# Patient Record
Sex: MALE | Race: WHITE | NOT HISPANIC OR LATINO | Employment: OTHER | URBAN - METROPOLITAN AREA
[De-identification: names, ages, dates, MRNs, and addresses within clinical notes are randomized per-mention and may not be internally consistent; named-entity substitution may affect disease eponyms.]

---

## 2022-02-21 ENCOUNTER — PATIENT ROUNDING (BHMG ONLY) (OUTPATIENT)
Dept: INTERNAL MEDICINE | Facility: CLINIC | Age: 77
End: 2022-02-21

## 2022-02-21 ENCOUNTER — OFFICE VISIT (OUTPATIENT)
Dept: INTERNAL MEDICINE | Facility: CLINIC | Age: 77
End: 2022-02-21

## 2022-02-21 VITALS
DIASTOLIC BLOOD PRESSURE: 64 MMHG | BODY MASS INDEX: 33.4 KG/M2 | TEMPERATURE: 97.3 F | HEART RATE: 62 BPM | OXYGEN SATURATION: 97 % | WEIGHT: 207.8 LBS | SYSTOLIC BLOOD PRESSURE: 118 MMHG | HEIGHT: 66 IN

## 2022-02-21 DIAGNOSIS — Z76.0 MEDICATION REFILL: ICD-10-CM

## 2022-02-21 DIAGNOSIS — E78.49 OTHER HYPERLIPIDEMIA: ICD-10-CM

## 2022-02-21 DIAGNOSIS — I10 BENIGN ESSENTIAL HYPERTENSION: ICD-10-CM

## 2022-02-21 DIAGNOSIS — Z76.89 ENCOUNTER TO ESTABLISH CARE: Primary | ICD-10-CM

## 2022-02-21 DIAGNOSIS — I25.10 ATHEROSCLEROSIS OF NATIVE CORONARY ARTERY OF NATIVE HEART, UNSPECIFIED WHETHER ANGINA PRESENT: ICD-10-CM

## 2022-02-21 PROBLEM — G47.33 OBSTRUCTIVE SLEEP APNEA SYNDROME: Status: ACTIVE | Noted: 2017-02-08

## 2022-02-21 PROBLEM — N40.0 BENIGN PROSTATIC HYPERPLASIA WITHOUT URINARY OBSTRUCTION: Status: ACTIVE | Noted: 2017-02-08

## 2022-02-21 PROBLEM — E78.5 HYPERLIPIDEMIA: Status: ACTIVE | Noted: 2022-02-21

## 2022-02-21 PROCEDURE — 99204 OFFICE O/P NEW MOD 45 MIN: CPT | Performed by: NURSE PRACTITIONER

## 2022-02-21 RX ORDER — METOPROLOL SUCCINATE 25 MG/1
25 TABLET, EXTENDED RELEASE ORAL DAILY
COMMUNITY
End: 2022-02-22 | Stop reason: SDUPTHER

## 2022-02-21 RX ORDER — LOSARTAN POTASSIUM 100 MG/1
100 TABLET ORAL DAILY
COMMUNITY
End: 2022-02-22 | Stop reason: SDUPTHER

## 2022-02-21 RX ORDER — ATORVASTATIN CALCIUM 10 MG/1
10 TABLET, FILM COATED ORAL DAILY
COMMUNITY
End: 2022-02-22 | Stop reason: SDUPTHER

## 2022-02-21 RX ORDER — ASPIRIN 81 MG/1
81 TABLET ORAL DAILY
COMMUNITY
End: 2022-08-23 | Stop reason: HOSPADM

## 2022-02-21 NOTE — PROGRESS NOTES
February 21, 2022    Hello, may I speak with Enzo Dial?    My name is almita  Spoke with pt at np appt    Tell me about your visit with us. What things went well?  no complaints       We're always looking for ways to make our patients' experiences even better. Do you have recommendations on ways we may improve?  no    Overall were you satisfied with your first visit to our practice? yes       I appreciate you taking the time to speak with me today. Is there anything else I can do for you? no      Thank you, and have a great day.

## 2022-02-21 NOTE — PROGRESS NOTES
Date of Encounter: 2022  Patient: Enzo Dial,  1945    Subjective     Chief complaint: Establish care and medication refills    HPI   Patient here today to establish care.  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  Patient is here today to get some medications refilled.    Patient states he used to follow with a cardiologist about twice a year.  He had had some stents placed about 5 years ago.  States that overall he is doing well today.      Review of Systems:  Negative for fever, congestion, chest pain upon exertion, shortness of breath, vision changes, vomiting, dysuria, lymphadenopathy, muscle weakness, numbness, mood changes, rashes.    The following portions of the patient's history were reviewed and updated as appropriate: allergies, current medications, past family history, past medical history, past social history, past surgical history and problem list.    Patient Active Problem List   Diagnosis   • Benign essential hypertension   • Benign prostatic hyperplasia without urinary obstruction   • Coronary atherosclerosis   • Hyperlipidemia   • Obstructive sleep apnea syndrome     History reviewed. No pertinent past medical history.  History reviewed. No pertinent surgical history.  History reviewed. No pertinent family history.    Current Outpatient Medications:   •  aspirin 81 MG EC tablet, Take 81 mg by mouth Daily., Disp: , Rfl:   •  atorvastatin (LIPITOR) 10 MG tablet, Take 10 mg by mouth Daily., Disp: , Rfl:   •  losartan (COZAAR) 100 MG tablet, Take 100 mg by mouth Daily., Disp: , Rfl:   •  metoprolol succinate XL (TOPROL-XL) 25 MG 24 hr tablet, Take 25 mg by mouth Daily., Disp: , Rfl:   No Known Allergies  Social History     Tobacco Use   • Smoking status: Never Smoker   • Smokeless tobacco: Not on file   Substance Use Topics   • Alcohol use: Not on file   • Drug use: Not on file          Objective   Physical Exam   Vitals:     "02/21/22 1126   BP: 118/64   Pulse: 62   Temp: 97.3 °F (36.3 °C)   TempSrc: Temporal   SpO2: 97%   Weight: 94.3 kg (207 lb 12.8 oz)   Height: 167.6 cm (66\")     Body mass index is 33.54 kg/m².    Constitutional: NAD.  Cardiovascular: RRR. No murmurs. No LE edema b/l.   Pulmonary: CTA b/l. Good effort.           Assessment/Plan   Assessment and Plan  Pleasant 77-year-old male with history of benign essential hypertension, coronary atherosclerosis, hyperlipidemia and others here today to establish care.    Diagnoses and all orders for this visit:    1. Encounter to establish care (Primary)   - Welcomed patient to practice. Discussed office policies. Reviewed patient reported medical history at bedside.    2. Other hyperlipidemia  -     CBC & Differential; Future  -     Comprehensive Metabolic Panel; Future  -     Lipid Panel; Future    3. Benign essential hypertension  -     CBC & Differential; Future  -     Comprehensive Metabolic Panel; Future  -     Ambulatory Referral to Cardiology   -Well-controlled on medications.  Patient to continue current medications at this time.    4. Medication refill   -Medications reviewed with patient at bedside.  Refills were sent to pharmacy.    5. Atherosclerosis of native coronary artery of native heart, unspecified whether angina present  -     Ambulatory Referral to Cardiology         Verbalized understanding of and agreed to plan of care.    Return in about 6 months (around 8/21/2022) for Medicare Wellness with fasting. .     A total of 45 minutes of time was spent on this encounter today.  This includes reviewing the patient's records, face-to-face time, documentation.    Renetta Calabrese DNP, FNP-C  [facility]  O: [phone]      Please note that portions of this note were completed with a voice recognition program. Please call if questions.     "

## 2022-02-22 RX ORDER — METOPROLOL SUCCINATE 25 MG/1
25 TABLET, EXTENDED RELEASE ORAL DAILY
Qty: 30 TABLET | Refills: 6 | Status: SHIPPED | OUTPATIENT
Start: 2022-02-22 | End: 2022-08-22

## 2022-02-22 RX ORDER — ATORVASTATIN CALCIUM 10 MG/1
10 TABLET, FILM COATED ORAL DAILY
Qty: 30 TABLET | Refills: 6 | Status: SHIPPED | OUTPATIENT
Start: 2022-02-22 | End: 2022-04-15

## 2022-02-22 RX ORDER — LOSARTAN POTASSIUM 100 MG/1
100 TABLET ORAL DAILY
Qty: 30 TABLET | Refills: 6 | Status: SHIPPED | OUTPATIENT
Start: 2022-02-22 | End: 2022-08-22

## 2022-02-22 NOTE — TELEPHONE ENCOUNTER
Rx Refill Note  Requested Prescriptions     Pending Prescriptions Disp Refills   • losartan (COZAAR) 100 MG tablet       Sig: Take 1 tablet by mouth Daily.   • metoprolol succinate XL (TOPROL-XL) 25 MG 24 hr tablet       Sig: Take 1 tablet by mouth Daily.   • atorvastatin (LIPITOR) 10 MG tablet       Sig: Take 1 tablet by mouth Daily.      Last office visit with prescribing clinician: 2/21/2022      Next office visit with prescribing clinician: Visit date not found            Mildred Cheung LPN  02/22/22, 14:49 EST

## 2022-02-22 NOTE — TELEPHONE ENCOUNTER
Caller: Enzo Dial    Relationship: Self    Best call back number: [phone]    Requested Prescriptions:   Requested Prescriptions     Pending Prescriptions Disp Refills   • losartan (COZAAR) 100 MG tablet       Sig: Take 1 tablet by mouth Daily.   • metoprolol succinate XL (TOPROL-XL) 25 MG 24 hr tablet       Sig: Take 1 tablet by mouth Daily.   • atorvastatin (LIPITOR) 10 MG tablet       Sig: Take 1 tablet by mouth Daily.        Pharmacy where request should be sent: [facility] [address] [address] - [phone] [facility] [phone] FX     Does the patient have less than a 3 day supply:  [x] Yes  [] No    Marlene Goldberg Rep   02/22/22 14:29 EST

## 2022-02-23 ENCOUNTER — CLINICAL SUPPORT (OUTPATIENT)
Dept: INTERNAL MEDICINE | Facility: CLINIC | Age: 77
End: 2022-02-23

## 2022-02-23 DIAGNOSIS — Z23 NEED FOR 23-POLYVALENT PNEUMOCOCCAL POLYSACCHARIDE VACCINE: Primary | ICD-10-CM

## 2022-02-23 PROCEDURE — 90732 PPSV23 VACC 2 YRS+ SUBQ/IM: CPT | Performed by: NURSE PRACTITIONER

## 2022-02-23 PROCEDURE — G0009 ADMIN PNEUMOCOCCAL VACCINE: HCPCS | Performed by: NURSE PRACTITIONER

## 2022-03-01 ENCOUNTER — TELEPHONE (OUTPATIENT)
Dept: INTERNAL MEDICINE | Facility: CLINIC | Age: 77
End: 2022-03-01

## 2022-03-01 ENCOUNTER — OFFICE VISIT (OUTPATIENT)
Dept: CARDIOLOGY | Facility: CLINIC | Age: 77
End: 2022-03-01

## 2022-03-01 VITALS
HEIGHT: 66 IN | DIASTOLIC BLOOD PRESSURE: 80 MMHG | WEIGHT: 203 LBS | SYSTOLIC BLOOD PRESSURE: 126 MMHG | HEART RATE: 71 BPM | BODY MASS INDEX: 32.62 KG/M2

## 2022-03-01 DIAGNOSIS — I10 BENIGN ESSENTIAL HYPERTENSION: Primary | ICD-10-CM

## 2022-03-01 DIAGNOSIS — Z95.5 HISTORY OF CORONARY ARTERY STENT PLACEMENT: Chronic | ICD-10-CM

## 2022-03-01 DIAGNOSIS — I25.10 ATHEROSCLEROSIS OF NATIVE CORONARY ARTERY OF NATIVE HEART, UNSPECIFIED WHETHER ANGINA PRESENT: ICD-10-CM

## 2022-03-01 PROCEDURE — 99204 OFFICE O/P NEW MOD 45 MIN: CPT | Performed by: INTERNAL MEDICINE

## 2022-03-01 PROCEDURE — 93000 ELECTROCARDIOGRAM COMPLETE: CPT | Performed by: INTERNAL MEDICINE

## 2022-03-01 NOTE — PROGRESS NOTES
"    Subjective:     Encounter Date:03/01/22      Patient ID: Enzo Dial is a 77 y.o. male.    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  I appreciate that you sent him in to see us.  They come in today to be seen for his history of coronary artery disease with prior stent placement.    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  While there is troponin was elevated.  He then had a subsequent cardiac catheterization and reportedly a stent was placed.  He is not sure where the stent was placed.  He does not know if there was other areas of coronary arterial sclerosis.  He had an echocardiogram and his understanding is that LV function was normal.    He did not have any anginal symptoms prior to that.  He not had any symptoms since.    The following portions of the patient's history were reviewed and updated as appropriate: allergies, current medications, past family history, past medical history, past social history, past surgical history and problem list.      ECG 12 Lead    Date/Time: 3/1/2022 3:59 PM  Performed by: Montana Huggins III, MD  Authorized by: Montana Huggins III, MD   Comparison: compared with previous ECG   Similar to previous ECG  Rhythm: sinus rhythm  Rate: normal  Conduction: conduction normal  ST Segments: ST segments normal  T Waves: T waves normal  QRS axis: normal  Other: no other findings    Clinical impression: normal ECG               Objective:     Vitals:    03/01/22 0928   BP: 126/80   Pulse: 71   Weight: 92.1 kg (203 lb)   Height: 167.6 cm (66\")     Body mass index is 32.77 kg/m².      Vitals reviewed.   Constitutional:       General: Not in acute distress.     Appearance: Well-developed. Not diaphoretic.   Eyes:      General:         Right eye: No discharge.         Left eye: No discharge. "      Conjunctiva/sclera: Conjunctivae normal.      Pupils: Pupils are equal, round, and reactive to light.   HENT:      Head: Normocephalic and atraumatic.      Nose: Nose normal.   Neck:      Thyroid: No thyromegaly.      Trachea: No tracheal deviation.      Lymphadenopathy: No cervical adenopathy.   Pulmonary:      Effort: Pulmonary effort is normal. No respiratory distress.      Breath sounds: Normal breath sounds. No stridor.   Chest:      Chest wall: Not tender to palpatation.   Cardiovascular:      Normal rate. Regular rhythm.      Murmurs: There is no murmur.      . No S3 gallop. No click. No rub.   Pulses:     Intact distal pulses.   Abdominal:      General: Bowel sounds are normal. There is no distension.      Palpations: Abdomen is soft. There is no abdominal mass.      Tenderness: There is no abdominal tenderness. There is no guarding or rebound.   Musculoskeletal: Normal range of motion.         General: No tenderness or deformity.      Cervical back: Normal range of motion and neck supple. Skin:     General: Skin is warm and dry.      Findings: No erythema or rash.   Neurological:      Mental Status: Alert and oriented to person, place, and time.      Deep Tendon Reflexes: Reflexes are normal and symmetric.   Psychiatric:         Thought Content: Thought content normal.         Data and records reviewed:     Lab Results   Component Value Date    GLUCOSE 99 02/23/2022    BUN 13 02/23/2022    CREATININE 1.09 02/23/2022    EGFRIFNONA 65 02/23/2022    EGFRIFAFRI 75 02/23/2022    BCR 12 02/23/2022    K 5.2 02/23/2022    CO2 23 02/23/2022    CALCIUM 9.5 02/23/2022    PROTENTOTREF 7.2 02/23/2022    ALBUMIN 4.0 02/23/2022    LABIL2 1.3 02/23/2022    AST 22 02/23/2022    ALT 21 02/23/2022     No results found for: CHOL  Lab Results   Component Value Date    TRIG 136 02/23/2022     Lab Results   Component Value Date    HDL 33 (L) 02/23/2022     Lab Results   Component Value Date     (H) 02/23/2022     Lab  Results   Component Value Date    VLDL 25 02/23/2022     No results found for: LDLHDL  CBC    CBC 2/23/22   WBC 7.8   RBC 5.64   Hemoglobin 14.4   Hematocrit 45.2   MCV 80   MCH 25.5 (A)   MCHC 31.9   RDW 15.5 (A)   Platelets 445   (A) Abnormal value            No radiology results for the last 90 days.          Assessment:          Diagnosis Plan   1. Benign essential hypertension  ECG 12 Lead   2. History of coronary artery stent placement  ECG 12 Lead   3. Atherosclerosis of native coronary artery of native heart, unspecified whether angina present  ECG 12 Lead          Plan:       One.  Coronary disease, prior stent placement-we will call to get records.  Will await those results.  He reports that it has been 5 years since his intervention, so anticipate that we will be setting up for a stress evaluation.  We will see what his prior LV function was and whether he will also need an echocardiogram.  2.  Mixed hyperlipidemia, recent lipid levels reviewed, incomplete control of his LDL, will increase his atorvastatin from 10 mg daily to 20 mg daily.  He states he does not need a prescription now because he just got 90 days and he will take 2 wrist tablets daily.  3.  Hypertension, await outside records    Cardiac catheterization from [facility] was obtained, performed January 2017.  This demonstrated a 90% stenosis in the midsection of LAD, 70 to 80% stenosis just prior to that.  LAD was noted right heart.  Circumflex showed no significant disease, there is a 90% focal lesion second obtuse marginal is a very small vessel.  RCA is dominant.  No stenosis was seen.  Patient then had 2 drug-eluting stents placed in the LAD.    Thank you very much for allowing us to participate in the care of this pleasant patient.  Please don't hesitate to call if I can be of assistance in any way.      Current Outpatient Medications:   •  aspirin 81 MG EC tablet, Take 81 mg by mouth Daily., Disp: , Rfl:   •  atorvastatin (LIPITOR) 10 MG  tablet, Take 1 tablet by mouth Daily., Disp: 30 tablet, Rfl: 6  •  losartan (COZAAR) 100 MG tablet, Take 1 tablet by mouth Daily., Disp: 30 tablet, Rfl: 6  •  metoprolol succinate XL (TOPROL-XL) 25 MG 24 hr tablet, Take 1 tablet by mouth Daily., Disp: 30 tablet, Rfl: 6         Return in about 1 year (around 3/1/2023).

## 2022-03-01 NOTE — TELEPHONE ENCOUNTER
Called patient to advise of lab results.  States went over them with Cardiologist this morning at appointment and got recommendations that might help.  No questions at this time.

## 2022-03-01 NOTE — TELEPHONE ENCOUNTER
----- Message from KEE Fam sent at 2/28/2022  9:20 AM EST -----  Please call patient and let him know the following about his labs.      Overall your lab work looks good.  Your HDL which is part of your cholesterol panel was slightly lower than we would like.  Introduce some healthy fats into your diet to help raise this number.  Continue all medications.  We can repeat these in about 6 months.

## 2022-04-14 ENCOUNTER — TELEPHONE (OUTPATIENT)
Dept: INTERNAL MEDICINE | Facility: CLINIC | Age: 77
End: 2022-04-14

## 2022-04-14 NOTE — TELEPHONE ENCOUNTER
PATIENT CALLED WANTED A UPDATED IN REGARDS TO MEDICATION REQUEST FOR HIGHER DOSAGE.     PATIENT IS COMPLETELY OUT OF THIS MEDICATION.     PLEASE CALL AND ADVISE     CALLBACK NUMBER: [phone]

## 2022-04-14 NOTE — TELEPHONE ENCOUNTER
PATIENT WAS SEEN BY CARDIOLOGIST  DR MTZ AND WAS TOLD THAT BASED IN HIS LABS WILL NEED TO DISCUSS WITH PCP ABOUT INCREASING DOSAGE OF ATORVASTATIN TO 20MG  PATIENT IS OUT OF MEDICATION NOW    [id number] [place], [place] - [address] [place] [address]  - [phone]  - [phone] FX

## 2022-04-15 ENCOUNTER — TELEPHONE (OUTPATIENT)
Dept: CARDIOLOGY | Facility: CLINIC | Age: 77
End: 2022-04-15

## 2022-04-15 RX ORDER — ATORVASTATIN CALCIUM 20 MG/1
20 TABLET, FILM COATED ORAL DAILY
Qty: 30 TABLET | Refills: 6 | Status: SHIPPED | OUTPATIENT
Start: 2022-04-15 | End: 2022-10-10

## 2022-08-22 RX ORDER — METOPROLOL SUCCINATE 25 MG/1
TABLET, EXTENDED RELEASE ORAL
Qty: 90 TABLET | Refills: 2 | Status: SHIPPED | OUTPATIENT
Start: 2022-08-22 | End: 2023-04-03 | Stop reason: SDUPTHER

## 2022-08-22 RX ORDER — LOSARTAN POTASSIUM 100 MG/1
TABLET ORAL
Qty: 90 TABLET | Refills: 2 | Status: SHIPPED | OUTPATIENT
Start: 2022-08-22 | End: 2023-04-03 | Stop reason: SDUPTHER

## 2022-08-23 ENCOUNTER — OFFICE VISIT (OUTPATIENT)
Dept: INTERNAL MEDICINE | Facility: CLINIC | Age: 77
End: 2022-08-23

## 2022-08-23 VITALS
DIASTOLIC BLOOD PRESSURE: 81 MMHG | HEIGHT: 66 IN | WEIGHT: 204.6 LBS | BODY MASS INDEX: 32.88 KG/M2 | TEMPERATURE: 97.8 F | OXYGEN SATURATION: 97 % | HEART RATE: 75 BPM | SYSTOLIC BLOOD PRESSURE: 126 MMHG

## 2022-08-23 DIAGNOSIS — I25.10 ATHEROSCLEROSIS OF NATIVE CORONARY ARTERY OF NATIVE HEART, UNSPECIFIED WHETHER ANGINA PRESENT: ICD-10-CM

## 2022-08-23 DIAGNOSIS — N40.0 BENIGN PROSTATIC HYPERPLASIA WITHOUT URINARY OBSTRUCTION: ICD-10-CM

## 2022-08-23 DIAGNOSIS — Z12.5 ENCOUNTER FOR SCREENING FOR MALIGNANT NEOPLASM OF PROSTATE: ICD-10-CM

## 2022-08-23 DIAGNOSIS — Z95.5 HISTORY OF CORONARY ARTERY STENT PLACEMENT: Chronic | ICD-10-CM

## 2022-08-23 DIAGNOSIS — Z00.00 ENCOUNTER FOR SUBSEQUENT ANNUAL WELLNESS VISIT (AWV) IN MEDICARE PATIENT: Primary | ICD-10-CM

## 2022-08-23 DIAGNOSIS — I10 BENIGN ESSENTIAL HYPERTENSION: ICD-10-CM

## 2022-08-23 DIAGNOSIS — G47.33 OBSTRUCTIVE SLEEP APNEA SYNDROME: ICD-10-CM

## 2022-08-23 DIAGNOSIS — E78.5 HYPERLIPIDEMIA, UNSPECIFIED HYPERLIPIDEMIA TYPE: ICD-10-CM

## 2022-08-23 PROCEDURE — 1170F FXNL STATUS ASSESSED: CPT | Performed by: NURSE PRACTITIONER

## 2022-08-23 PROCEDURE — 1159F MED LIST DOCD IN RCRD: CPT | Performed by: NURSE PRACTITIONER

## 2022-08-23 PROCEDURE — G0439 PPPS, SUBSEQ VISIT: HCPCS | Performed by: NURSE PRACTITIONER

## 2022-08-23 PROCEDURE — 1126F AMNT PAIN NOTED NONE PRSNT: CPT | Performed by: NURSE PRACTITIONER

## 2022-08-23 NOTE — PROGRESS NOTES
Date of Encounter: 2022  Patient: Enzo Dial,  1945    SUBSEQUENT MEDICARE WELLNESS VISIT  Subjective   History of Presenting Illness  Chief complaint: Wellness    Patient here today for annual wellness visit.  Patient states that he is doing well.  Patient is not fasting today, he had breakfast.  Patient states he would still like to update some of his lab work including his PSA.    Patient has no acute issues.  States that his blood pressure has been well controlled.          Pain  In the past 7 days, how much pain have you felt? None.    Review of Systems:  Negative for fever, congestion, chest pain upon exertion, shortness of breath, vision changes, vomiting, dysuria, lymphadenopathy, muscle weakness, numbness, mood changes, rashes.    Health Risk Assessment:    Recent Hospitalizations:  No hospitalization(s) within the last year.    Current Medical Providers:  Patient Care Team:  Renetta Calabrese APRN as PCP - General (Family Medicine)    Smoking Status:  Social History     Tobacco Use   Smoking Status Never Smoker   Smokeless Tobacco Never Used       Alcohol Consumption:  Social History     Substance and Sexual Activity   Alcohol Use Not Currently       Depression Screen:   PHQ-2/PHQ-9 Depression Screening 2022   Retired PHQ-9 Total Score -   Retired Total Score -   Little Interest or Pleasure in Doing Things 0-->not at all   Feeling Down, Depressed or Hopeless 0-->not at all   PHQ-9: Brief Depression Severity Measure Score 0     I spent more than 16 minutes asking patient questions, counseling and documenting in the chart    Fall Risk Screen:  JERI Fall Risk Assessment was completed, and patient is at LOW risk for falls.Assessment completed on:2022    Health Habits and Functional and Cognitive Screening:  Functional & Cognitive Status 2022   Do you have difficulty preparing food and eating? No   Do you have difficulty bathing yourself, getting dressed or grooming yourself? No    Do you have difficulty using the toilet? No   Do you have difficulty moving around from place to place? No   Do you have trouble with steps or getting out of a bed or a chair? No   Do you need help using the phone?  No   Are you deaf or do you have serious difficulty hearing?  No   Do you need help with transportation? No   Do you need help shopping? No   Do you need help preparing meals?  No   Do you need help with housework?  No   Do you need help with laundry? No   Do you need help taking your medications? No   Do you need help managing money? No   Do you ever drive or ride in a car without wearing a seat belt? No   Do you have difficulty concentrating, remembering or making decisions? No       Does the patient have evidence of cognitive impairment? No    Aspirin use counseling:Does not need ASA (and currently is not on it)    Recent Lab Results:        Age-appropriate Screening Schedule:  Refer to the list below for future screening recommendations based on patient's age, sex and/or medical conditions. Orders for these recommended tests are listed in the plan section. The patient has been provided with a written plan.    Health Maintenance   Topic Date Due   • ZOSTER VACCINE (1 of 2) Never done   • INFLUENZA VACCINE  10/01/2022   • LIPID PANEL  02/23/2023   • TDAP/TD VACCINES (2 - Td or Tdap) 01/01/2027       Compared to one year ago, the patient feels his physical health is the same.  Compared to one year ago, the patient feels his mental health is the same.    Reviewed chart for potential of high risk medication in the elderly: yes  Reviewed chart for potential of harmful drug interactions in the elderly:yes    Advanced Care Planning:  Advance Care Planning   ACP discussion was held with the patient during this visit. Patient does not have an advance directive, information provided.    The following portions of the patient's history were reviewed and updated as appropriate: allergies, current medications,  "past family history, past medical history, past social history, past surgical history and problem list.    Patient Active Problem List   Diagnosis   • Benign essential hypertension   • Benign prostatic hyperplasia without urinary obstruction   • Coronary atherosclerosis   • Hyperlipidemia   • Obstructive sleep apnea syndrome   • History of coronary artery stent placement     Past Medical History:   Diagnosis Date   • Coronary artery disease    • History of coronary artery stent placement 3/1/2022   • Hyperlipidemia    • Hypertension      Past Surgical History:   Procedure Laterality Date   • CARDIAC CATHETERIZATION     • COLONOSCOPY     • CORONARY ANGIOPLASTY WITH STENT PLACEMENT     • NOSE SURGERY       Family History   Problem Relation Age of Onset   • Stroke Mother    • Hypertension Mother    • Heart attack Father 47   • Hypertension Father        Current Outpatient Medications:   •  atorvastatin (LIPITOR) 20 MG tablet, Take 1 tablet by mouth Daily., Disp: 30 tablet, Rfl: 6  •  losartan (COZAAR) 100 MG tablet, TAKE 1 TABLET BY MOUTH EVERY DAY, Disp: 90 tablet, Rfl: 2  •  metoprolol succinate XL (TOPROL-XL) 25 MG 24 hr tablet, TAKE 1 TABLET BY MOUTH EVERY DAY, Disp: 90 tablet, Rfl: 2  No Known Allergies  Social History     Tobacco Use   • Smoking status: Never Smoker   • Smokeless tobacco: Never Used   Substance Use Topics   • Alcohol use: Not Currently          Objective   Physical Exam  Vitals:    08/23/22 0958   BP: 126/81   Pulse: 75   Temp: 97.8 °F (36.6 °C)   TempSrc: Temporal   SpO2: 97%   Weight: 92.8 kg (204 lb 9.6 oz)   Height: 167.6 cm (66\")     Body mass index is 33.02 kg/m².  Discussed the patient's BMI with him. The BMI Is above average.  Discussed about weight loss options with patient..    Constitutional: NAD.  Eyes: EOMI. PERRLA. Normal conjunctiva.  Ear, nose, mouth, throat: No tonsillar exudates or erythema.   Normal nasal mucosa. Normal external ear canals and TMs bilaterally.  Cardiovascular: " RRR. No murmurs. No LE edema b/l. Radial pulses 2+ bilaterally.  Pulmonary: CTA b/l. Good effort.  Integumentary: No lacerations or wounds on face or upper extremities.  Lymphatic: No anterior cervical lymphadenopathy.  Endocrine: No thyromegaly or palpable thyroid nodules.  Psychiatric: Normal affect. Normal thought content.       Assessment & Plan   Assessment and Plan  Pleasant 77-year-old male with hyperlipidemia, benign essential hypertension, coronary atherosclerosis, benign prostatic hyperplasia without urinary obstruction and others here today for wellness visit.  The following was discussed:    Advance Directive Discussion  Cardiovascular risk  Immunizations Discussed/Encouraged (specific immunizations; Shingrix )  Inactivity/Sedentary  Prostate Cancer Screening     The above risks/problems have been discussed with the patient.  Pertinent information has been shared with the patient in the After Visit Summary.  Other plans as below:    Diagnoses and all orders for this visit:    1. Encounter for subsequent annual wellness visit (AWV) in Medicare patient (Primary)  -     CBC & Differential  -     Comprehensive Metabolic Panel  -     Lipid Panel   We discussed preventative care including age and patient-appropriate immunizations and cancer screening. We also discussed the importance of exercise and a healthy diet. This is their annual preventative exam.    2. Benign essential hypertension  -     CBC & Differential  -     Comprehensive Metabolic Panel    3. Benign prostatic hyperplasia without urinary obstruction  -     PSA Screen    4. Hyperlipidemia, unspecified hyperlipidemia type  -     Lipid Panel    5. Encounter for screening for malignant neoplasm of prostate   -     PSA Screen    6. Obstructive sleep apnea syndrome    7. Atherosclerosis of native coronary artery of native heart, unspecified whether angina present  Assessment & Plan:  Reviewed medications.  Answered some questions from the  patient.      8. History of coronary artery stent placement   Discussed about history of stent placement with patient, also discussed about aspirin use.  Patient used in the past, not currently taking.  Answered some questions from the patient.    Patient verbalized understanding of and agreed to plan of care.    Follow Up:  6 months with fasting labs.    An After Visit Summary and PPPS were given to the patient.      Renetta Calabrese DNP, FNP-C  [facility]  O: [phone]    Please note that portions of this note were completed with a voice recognition program. Please call if questions.

## 2022-08-24 ENCOUNTER — TELEPHONE (OUTPATIENT)
Dept: INTERNAL MEDICINE | Facility: CLINIC | Age: 77
End: 2022-08-24

## 2022-08-24 LAB
ALBUMIN SERPL-MCNC: 4.2 G/DL (ref 3.7–4.7)
ALBUMIN/GLOB SERPL: 1.7 {RATIO} (ref 1.2–2.2)
ALP SERPL-CCNC: 140 IU/L (ref 44–121)
ALT SERPL-CCNC: 22 IU/L (ref 0–44)
AST SERPL-CCNC: 23 IU/L (ref 0–40)
BASOPHILS # BLD AUTO: 0.1 X10E3/UL (ref 0–0.2)
BASOPHILS NFR BLD AUTO: 1 %
BILIRUB SERPL-MCNC: 0.7 MG/DL (ref 0–1.2)
BUN SERPL-MCNC: 17 MG/DL (ref 8–27)
BUN/CREAT SERPL: 17 (ref 10–24)
CALCIUM SERPL-MCNC: 9.3 MG/DL (ref 8.6–10.2)
CHLORIDE SERPL-SCNC: 102 MMOL/L (ref 96–106)
CHOLEST SERPL-MCNC: 118 MG/DL (ref 100–199)
CO2 SERPL-SCNC: 22 MMOL/L (ref 20–29)
CREAT SERPL-MCNC: 1.02 MG/DL (ref 0.76–1.27)
EGFRCR-CYS SERPLBLD CKD-EPI 2021: 76 ML/MIN/1.73
EOSINOPHIL # BLD AUTO: 0.2 X10E3/UL (ref 0–0.4)
EOSINOPHIL NFR BLD AUTO: 2 %
ERYTHROCYTE [DISTWIDTH] IN BLOOD BY AUTOMATED COUNT: 14.2 % (ref 11.6–15.4)
GLOBULIN SER CALC-MCNC: 2.5 G/DL (ref 1.5–4.5)
GLUCOSE SERPL-MCNC: 123 MG/DL (ref 65–99)
HCT VFR BLD AUTO: 41.1 % (ref 37.5–51)
HDLC SERPL-MCNC: 31 MG/DL
HGB BLD-MCNC: 13.4 G/DL (ref 13–17.7)
IMM GRANULOCYTES # BLD AUTO: 0 X10E3/UL (ref 0–0.1)
IMM GRANULOCYTES NFR BLD AUTO: 0 %
LDLC SERPL CALC-MCNC: 66 MG/DL (ref 0–99)
LYMPHOCYTES # BLD AUTO: 2.6 X10E3/UL (ref 0.7–3.1)
LYMPHOCYTES NFR BLD AUTO: 34 %
MCH RBC QN AUTO: 27.5 PG (ref 26.6–33)
MCHC RBC AUTO-ENTMCNC: 32.6 G/DL (ref 31.5–35.7)
MCV RBC AUTO: 84 FL (ref 79–97)
MONOCYTES # BLD AUTO: 1 X10E3/UL (ref 0.1–0.9)
MONOCYTES NFR BLD AUTO: 13 %
NEUTROPHILS # BLD AUTO: 4 X10E3/UL (ref 1.4–7)
NEUTROPHILS NFR BLD AUTO: 50 %
PLATELET # BLD AUTO: 398 X10E3/UL (ref 150–450)
POTASSIUM SERPL-SCNC: 4.9 MMOL/L (ref 3.5–5.2)
PROT SERPL-MCNC: 6.7 G/DL (ref 6–8.5)
PSA SERPL-MCNC: 0.8 NG/ML (ref 0–4)
RBC # BLD AUTO: 4.88 X10E6/UL (ref 4.14–5.8)
SODIUM SERPL-SCNC: 139 MMOL/L (ref 134–144)
TRIGL SERPL-MCNC: 112 MG/DL (ref 0–149)
VLDLC SERPL CALC-MCNC: 21 MG/DL (ref 5–40)
WBC # BLD AUTO: 7.7 X10E3/UL (ref 3.4–10.8)

## 2022-08-24 NOTE — TELEPHONE ENCOUNTER
----- Message from KEE Fam sent at 8/24/2022 10:58 AM EDT -----  Please call the patient to update them regarding their results.   Your lab work came back and everything was in good range.  I do recommend increasing healthy fat in your diet with an over-the-counter fish oil supplement.  Your PSA was normal at 0.8.  We will recheck at your next visit in 6 months.

## 2022-08-24 NOTE — TELEPHONE ENCOUNTER
Spoke to pt about lab results and providers suggestions. Pt understands, and has no questions at this time.

## 2022-10-04 ENCOUNTER — OFFICE VISIT (OUTPATIENT)
Dept: INTERNAL MEDICINE | Facility: CLINIC | Age: 77
End: 2022-10-04

## 2022-10-04 VITALS
BODY MASS INDEX: 32.78 KG/M2 | HEART RATE: 64 BPM | WEIGHT: 204 LBS | OXYGEN SATURATION: 98 % | DIASTOLIC BLOOD PRESSURE: 84 MMHG | HEIGHT: 66 IN | SYSTOLIC BLOOD PRESSURE: 130 MMHG | TEMPERATURE: 97.4 F

## 2022-10-04 DIAGNOSIS — J06.9 UPPER RESPIRATORY TRACT INFECTION, UNSPECIFIED TYPE: Primary | ICD-10-CM

## 2022-10-04 PROCEDURE — 99213 OFFICE O/P EST LOW 20 MIN: CPT | Performed by: NURSE PRACTITIONER

## 2022-10-04 RX ORDER — METHYLPREDNISOLONE 4 MG/1
TABLET ORAL
Qty: 21 TABLET | Refills: 0 | Status: SHIPPED | OUTPATIENT
Start: 2022-10-04 | End: 2023-04-03

## 2022-10-04 RX ORDER — AZITHROMYCIN 250 MG/1
TABLET, FILM COATED ORAL
Qty: 6 TABLET | Refills: 0 | Status: SHIPPED | OUTPATIENT
Start: 2022-10-04 | End: 2023-04-03

## 2022-10-04 NOTE — PROGRESS NOTES
Date of Encounter: 10/04/2022  Patient: Enzo Dial,  1945    Subjective     Chief complaint: Cough    HPI   Patient here today for sick visit.  Patient states that about 10 days ago he had some sinus congestion as well as slight fever.  Patient states that overall he just did not feel well.  Patient thought that he would give it some time and his symptoms would subside.  Patient states that his cough has gotten better but has not completely resolved.    Review of Systems:  Negative for fever, congestion, chest pain upon exertion, shortness of breath, vision changes, vomiting, dysuria, lymphadenopathy, muscle weakness, numbness, mood changes, rashes.    The following portions of the patient's history were reviewed and updated as appropriate: allergies, current medications, past family history, past medical history, past social history, past surgical history and problem list.    Patient Active Problem List   Diagnosis   • Benign essential hypertension   • Benign prostatic hyperplasia without urinary obstruction   • Coronary atherosclerosis   • Hyperlipidemia   • Obstructive sleep apnea syndrome   • History of coronary artery stent placement     Past Medical History:   Diagnosis Date   • Coronary artery disease    • History of coronary artery stent placement 3/1/2022   • Hyperlipidemia    • Hypertension      Past Surgical History:   Procedure Laterality Date   • CARDIAC CATHETERIZATION     • COLONOSCOPY     • CORONARY ANGIOPLASTY WITH STENT PLACEMENT     • NOSE SURGERY       Family History   Problem Relation Age of Onset   • Stroke Mother    • Hypertension Mother    • Heart attack Father 47   • Hypertension Father        Current Outpatient Medications:   •  atorvastatin (LIPITOR) 20 MG tablet, Take 1 tablet by mouth Daily., Disp: 30 tablet, Rfl: 6  •  azithromycin (Zithromax Z-Chirag) 250 MG tablet, Take 2 tablets by mouth on day 1, then 1 tablet daily on days 2-5, Disp: 6 tablet, Rfl: 0  •  losartan (COZAAR)  "100 MG tablet, TAKE 1 TABLET BY MOUTH EVERY DAY, Disp: 90 tablet, Rfl: 2  •  methylPREDNISolone (MEDROL) 4 MG dose pack, Take as directed on package instructions., Disp: 21 tablet, Rfl: 0  •  metoprolol succinate XL (TOPROL-XL) 25 MG 24 hr tablet, TAKE 1 TABLET BY MOUTH EVERY DAY, Disp: 90 tablet, Rfl: 2  No Known Allergies  Social History     Tobacco Use   • Smoking status: Never Smoker   • Smokeless tobacco: Never Used   Substance Use Topics   • Alcohol use: Not Currently          Objective   Physical Exam   Vitals:    10/04/22 1217   BP: 130/84   Pulse: 64   Temp: 97.4 °F (36.3 °C)   TempSrc: Temporal   SpO2: 98%   Weight: 92.5 kg (204 lb)   Height: 167.6 cm (66\")     Body mass index is 32.93 kg/m².    Constitutional: NAD.  Eyes: Normal conjunctiva.  Ear, nose, mouth, throat: No tonsillar exudates positive erythema.   Normal nasal mucosa. Normal external ear canals and TMs bilaterally.  Cardiovascular: RRR. No murmurs.   Pulmonary: CTA b/l. Good effort.  Positive for productive cough.  Integumentary: No rashes or wounds on face or upper extremities.           Assessment & Plan   Assessment and Plan  Pleasant 77-year-old male with benign essential hypertension, coronary atherosclerosis, hyperlipidemia and others here today to discuss the following:    Diagnoses and all orders for this visit:    1. Upper respiratory tract infection, unspecified type (Primary)  -     azithromycin (Zithromax Z-Chirag) 250 MG tablet; Take 2 tablets by mouth on day 1, then 1 tablet daily on days 2-5  Dispense: 6 tablet; Refill: 0  -     methylPREDNISolone (MEDROL) 4 MG dose pack; Take as directed on package instructions.  Dispense: 21 tablet; Refill: 0         Discussed with patient about plan of care.  Discussed about medications and side effects.  Answered some questions from the patient.  Patient verbalized understanding of and agreed to plan of care.    Return if symptoms worsen or fail to improve.     Renetta Calabrese DNP, " FNP-C  [facility]  O: [phone]      Please note that portions of this note were completed with a voice recognition program. Please call if questions.

## 2022-10-10 RX ORDER — ATORVASTATIN CALCIUM 20 MG/1
TABLET, FILM COATED ORAL
Qty: 90 TABLET | Refills: 2 | Status: SHIPPED | OUTPATIENT
Start: 2022-10-10 | End: 2023-04-03 | Stop reason: SDUPTHER

## 2023-04-03 ENCOUNTER — TELEPHONE (OUTPATIENT)
Dept: CARDIOLOGY | Facility: CLINIC | Age: 78
End: 2023-04-03
Payer: MEDICARE

## 2023-04-03 ENCOUNTER — OFFICE VISIT (OUTPATIENT)
Dept: INTERNAL MEDICINE | Facility: CLINIC | Age: 78
End: 2023-04-03
Payer: MEDICARE

## 2023-04-03 VITALS
HEIGHT: 66 IN | WEIGHT: 207 LBS | DIASTOLIC BLOOD PRESSURE: 60 MMHG | OXYGEN SATURATION: 94 % | HEART RATE: 77 BPM | BODY MASS INDEX: 33.27 KG/M2 | TEMPERATURE: 98.4 F | SYSTOLIC BLOOD PRESSURE: 128 MMHG

## 2023-04-03 DIAGNOSIS — J06.9 ACUTE UPPER RESPIRATORY INFECTION: ICD-10-CM

## 2023-04-03 DIAGNOSIS — I25.10 ATHEROSCLEROSIS OF NATIVE CORONARY ARTERY OF NATIVE HEART, UNSPECIFIED WHETHER ANGINA PRESENT: ICD-10-CM

## 2023-04-03 DIAGNOSIS — R05.9 COUGH, UNSPECIFIED TYPE: Primary | ICD-10-CM

## 2023-04-03 DIAGNOSIS — J02.9 SORE THROAT: ICD-10-CM

## 2023-04-03 DIAGNOSIS — R09.89 CHEST CONGESTION: ICD-10-CM

## 2023-04-03 LAB
EXPIRATION DATE: NORMAL
EXPIRATION DATE: NORMAL
FLUAV AG UPPER RESP QL IA.RAPID: NOT DETECTED
FLUBV AG UPPER RESP QL IA.RAPID: NOT DETECTED
INTERNAL CONTROL: NORMAL
INTERNAL CONTROL: NORMAL
Lab: NORMAL
Lab: NORMAL
S PYO AG THROAT QL: NEGATIVE
SARS-COV-2 AG UPPER RESP QL IA.RAPID: NOT DETECTED

## 2023-04-03 PROCEDURE — 87880 STREP A ASSAY W/OPTIC: CPT | Performed by: NURSE PRACTITIONER

## 2023-04-03 PROCEDURE — 87428 SARSCOV & INF VIR A&B AG IA: CPT | Performed by: NURSE PRACTITIONER

## 2023-04-03 PROCEDURE — 1160F RVW MEDS BY RX/DR IN RCRD: CPT | Performed by: NURSE PRACTITIONER

## 2023-04-03 PROCEDURE — 99213 OFFICE O/P EST LOW 20 MIN: CPT | Performed by: NURSE PRACTITIONER

## 2023-04-03 PROCEDURE — 1159F MED LIST DOCD IN RCRD: CPT | Performed by: NURSE PRACTITIONER

## 2023-04-03 PROCEDURE — 3078F DIAST BP <80 MM HG: CPT | Performed by: NURSE PRACTITIONER

## 2023-04-03 PROCEDURE — 3074F SYST BP LT 130 MM HG: CPT | Performed by: NURSE PRACTITIONER

## 2023-04-03 RX ORDER — METOPROLOL SUCCINATE 25 MG/1
25 TABLET, EXTENDED RELEASE ORAL DAILY
Qty: 90 TABLET | Refills: 3 | Status: SHIPPED | OUTPATIENT
Start: 2023-04-03

## 2023-04-03 RX ORDER — ATORVASTATIN CALCIUM 20 MG/1
20 TABLET, FILM COATED ORAL DAILY
Qty: 90 TABLET | Refills: 3 | Status: SHIPPED | OUTPATIENT
Start: 2023-04-03

## 2023-04-03 RX ORDER — METHYLPREDNISOLONE 4 MG/1
TABLET ORAL
Qty: 21 TABLET | Refills: 0 | Status: SHIPPED | OUTPATIENT
Start: 2023-04-03

## 2023-04-03 RX ORDER — LOSARTAN POTASSIUM 100 MG/1
100 TABLET ORAL DAILY
Qty: 90 TABLET | Refills: 3 | Status: SHIPPED | OUTPATIENT
Start: 2023-04-03

## 2023-04-03 RX ORDER — DEXTROMETHORPHAN HYDROBROMIDE AND PROMETHAZINE HYDROCHLORIDE 15; 6.25 MG/5ML; MG/5ML
5 SYRUP ORAL 4 TIMES DAILY PRN
Qty: 180 ML | Refills: 0 | Status: SHIPPED | OUTPATIENT
Start: 2023-04-03 | End: 2023-04-03

## 2023-04-03 RX ORDER — DEXTROMETHORPHAN HYDROBROMIDE AND PROMETHAZINE HYDROCHLORIDE 15; 6.25 MG/5ML; MG/5ML
5 SYRUP ORAL 4 TIMES DAILY PRN
Qty: 180 ML | Refills: 0 | Status: SHIPPED | OUTPATIENT
Start: 2023-04-03

## 2023-04-03 NOTE — PROGRESS NOTES
"Chief Complaint  Cough, URI (SOA when trying to catch breath while coughing), Establish Care, and Sore Throat    Subjective        Enzo Dial presents to [facility]  History of Present Illness  Here with complaint of cough, which started 4 days ago. [place] was [place]. He has not taken any medication for it. He is producing a very small amount of phlegm, now dry. Feels like he is wheezing.     No ear pain, mild sore throat, no ear pain.     He was last seen in our office on October 4, 2022 with plaint of sinus congestion and cough.  Was prescribed Z-Chirag and Medrol Dosepak.      Objective   Vital Signs:  /60 (BP Location: Left arm, Patient Position: Sitting, Cuff Size: Adult)   Pulse 77   Temp 98.4 °F (36.9 °C) (Infrared)   Ht 167.6 cm (65.98\")   Wt 93.9 kg (207 lb)   SpO2 94%   BMI 33.43 kg/m²   Estimated body mass index is 33.43 kg/m² as calculated from the following:    Height as of this encounter: 167.6 cm (65.98\").    Weight as of this encounter: 93.9 kg (207 lb).             Physical Exam  Constitutional:       General: He is not in acute distress.     Appearance: Normal appearance. He is not ill-appearing.   Cardiovascular:      Rate and Rhythm: Normal rate. Rhythm irregular.      Pulses: Normal pulses.      Heart sounds: Murmur heard.   Pulmonary:      Effort: Pulmonary effort is normal.      Breath sounds: Examination of the left-lower field reveals rales. Rales present.   Neurological:      Mental Status: He is alert.        Result Review :                   Assessment and Plan   Patient is a pleasant 78-year-old male with history of coronary artery stent placement, benign essential hypertension, coronary atherosclerosis, hyperlipidemia, benign prostate hyperplasia without urinary obstruction, obstructive sleep apnea syndrome with complaints of acute respiratory infection for the past 4 days.    Results for orders placed or performed in " visit on 04/03/23   POC Rapid Strep A    Specimen: Swab   Result Value Ref Range    Rapid Strep A Screen Negative Negative, VALID, INVALID, Not Performed    Internal Control Passed Passed    Lot Number [id number]     Expiration Date 12/14/2025    POCT SARS-CoV-2 Antigen JOSE    Specimen: Swab   Result Value Ref Range    SARS Antigen Not Detected Not Detected, Presumptive Negative    Influenza A Antigen JOSE Not Detected Not Detected    Influenza B Antigen JOSE Not Detected Not Detected    Internal Control Passed Passed    Lot Number [id number]     Expiration Date 03/16/2024          Diagnoses with recommendations/treatments are as follows:      Diagnoses and all orders for this visit:    1. Cough, unspecified type (Primary)  Comments:  If you develop shortness of breath, increased work with breathing, chest pain, and fatigue, call 911 and proceed to the emergency department.  Orders:  -     POCT SARS-CoV-2 Antigen JOSE    2. Chest congestion  -     POCT SARS-CoV-2 Antigen JOSE    3. Sore throat  -     POC Rapid Strep A    4. Atherosclerosis of native coronary artery of native heart, unspecified whether angina present  Comments:  Continue to follow Dr. Montana Huggins III, cardiology.    5. Acute upper respiratory infection  Comments:  Be sure to monitor your blood pressure while on steroid dosepak and cough syrup.   Orders:  -     methylPREDNISolone (MEDROL) 4 MG dose pack; Take as directed on package instructions.  Dispense: 21 tablet; Refill: 0  -     promethazine-dextromethorphan (PROMETHAZINE-DM) 6.25-15 MG/5ML syrup; Take 5 mL by mouth 4 (Four) Times a Day As Needed for Cough.  Dispense: 180 mL; Refill: 0    Other orders  -     Discontinue: promethazine-dextromethorphan (PROMETHAZINE-DM) 6.25-15 MG/5ML syrup; Take 5 mL by mouth 4 (Four) Times a Day As Needed for Cough.  Dispense: 180 mL; Refill: 0             Follow Up   Return if symptoms worsen or fail to improve. If you have increased work with breathing, shortness of  air, fatigue, or chest pain, call 911 and proceed to the ED.   Patient was given instructions and counseling regarding his condition or for health maintenance advice. Please see specific information pulled into the AVS if appropriate.

## 2023-04-03 NOTE — TELEPHONE ENCOUNTER
Spoke with pt, adv that Dr. Huggins refilled his medications. Pt verbalized understanding. Pt had no further questions.     Antionette Jacques MA  4/3/23 4:09P

## 2023-04-03 NOTE — TELEPHONE ENCOUNTER
Returned pt VM. Spoke with patient whom adv that he needs refills on Metoprolol 25 MG. Losartan 100 MG, and Lipitor 20 MG as his previous PCP has closed and he doesn't have anyone else to refill his medications. He did state that he has an upcoming appointment with a new PCP with [facility] but did not state when. Pt wants to know if Dr. Huggins can refill the medications.    Antionette Jacques MA  4/3/2023 11:24A

## 2023-04-04 ENCOUNTER — PATIENT ROUNDING (BHMG ONLY) (OUTPATIENT)
Dept: INTERNAL MEDICINE | Facility: CLINIC | Age: 78
End: 2023-04-04
Payer: MEDICARE

## 2023-04-04 NOTE — PROGRESS NOTES
April 4, 2023    Hello, may I speak with Enzo Dial?    lvm welcoming pt to office    I am  with [facility] [place]  [facility]  [address] [place] [address] [phone].    Before we get started may I verify your date of birth? 1945    I am calling to officially welcome you to our practice and ask about your recent visit. Is this a good time to talk? na    Tell me about your visit with us. What things went well?  na       We're always looking for ways to make our patients' experiences even better. Do you have recommendations on ways we may improve?  na    Overall were you satisfied with your first visit to our practice? na       I appreciate you taking the time to speak with me today. Is there anything else I can do for you? na      Thank you, and have a great day.

## 2023-06-16 ENCOUNTER — OFFICE VISIT (OUTPATIENT)
Dept: CARDIOLOGY | Facility: CLINIC | Age: 78
End: 2023-06-16
Payer: MEDICARE

## 2023-06-16 VITALS
WEIGHT: 204 LBS | HEIGHT: 66 IN | HEART RATE: 59 BPM | BODY MASS INDEX: 32.78 KG/M2 | SYSTOLIC BLOOD PRESSURE: 110 MMHG | DIASTOLIC BLOOD PRESSURE: 64 MMHG

## 2023-06-16 DIAGNOSIS — Z95.5 HISTORY OF CORONARY ARTERY STENT PLACEMENT: Primary | Chronic | ICD-10-CM

## 2023-06-16 DIAGNOSIS — I10 BENIGN ESSENTIAL HYPERTENSION: ICD-10-CM

## 2023-06-16 DIAGNOSIS — I25.10 ATHEROSCLEROSIS OF NATIVE CORONARY ARTERY OF NATIVE HEART, UNSPECIFIED WHETHER ANGINA PRESENT: ICD-10-CM

## 2023-06-16 DIAGNOSIS — E78.2 MIXED HYPERLIPIDEMIA: ICD-10-CM

## 2023-06-16 RX ORDER — ATORVASTATIN CALCIUM 20 MG/1
20 TABLET, FILM COATED ORAL DAILY
Qty: 90 TABLET | Refills: 3 | Status: SHIPPED | OUTPATIENT
Start: 2023-06-16

## 2023-06-16 RX ORDER — METOPROLOL SUCCINATE 25 MG/1
25 TABLET, EXTENDED RELEASE ORAL DAILY
Qty: 90 TABLET | Refills: 3 | Status: SHIPPED | OUTPATIENT
Start: 2023-06-16

## 2023-06-16 RX ORDER — LOSARTAN POTASSIUM 100 MG/1
100 TABLET ORAL DAILY
Qty: 90 TABLET | Refills: 3 | Status: SHIPPED | OUTPATIENT
Start: 2023-06-16

## 2023-06-16 NOTE — PROGRESS NOTES
Subjective:     Encounter Date:06/16/23      Patient ID: Enzo Dial is a 78 y.o. male.    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    He denies any chest pain, pressure, tightness, squeezing, or heartburn.  He has not experienced any feeling of palpitations, tachycardia or heart racing and no presyncope or syncope.  There has not been any problems with dizziness or lightheadedness.  There has not been any orthopnea or PND, and no problems with lower extremity edema.  He denies any shortness of breath at rest or with activity and has not had any wheezing.  He  has continued to perform daily activities of living without any specific problem or change in the level of activity.    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  While there is troponin was elevated.  He then had a subsequent cardiac catheterization and reportedly a stent was placed.      Cardiac catheterization from [place] was obtained, performed January 2017.  This demonstrated a 90% stenosis in the midsection of LAD, 70 to 80% stenosis just prior to that.  LAD was noted right heart.  Circumflex showed no significant disease, there is a 90% focal lesion second obtuse marginal is a very small vessel.  RCA is dominant.  No stenosis was seen.  Patient then had 2 drug-eluting stents placed in the LAD.    The following portions of the patient's history were reviewed and updated as appropriate: allergies, current medications, past family history, past medical history, past social history, past surgical history and problem list.      ECG 12 Lead    Date/Time: 6/16/2023 1:44 PM  Performed by: Montana Huggins III, MD  Authorized by: Montana Huggins III, MD   Comparison: compared with previous ECG   Similar to previous  "ECG  Rhythm: sinus rhythm  Rate: normal  Conduction: conduction normal  ST Segments: ST segments normal  T Waves: T waves normal  QRS axis: normal  Other: no other findings    Clinical impression: normal ECG           Objective:     Vitals:    06/16/23 1330   BP: 110/64   Pulse: 59   Weight: 92.5 kg (204 lb)   Height: 167.6 cm (65.98\")     Body mass index is 32.95 kg/m².      Vitals reviewed.   Constitutional:       General: Not in acute distress.     Appearance: Well-developed. Not diaphoretic.   Eyes:      General:         Right eye: No discharge.         Left eye: No discharge.      Conjunctiva/sclera: Conjunctivae normal.      Pupils: Pupils are equal, round, and reactive to light.   HENT:      Head: Normocephalic and atraumatic.      Nose: Nose normal.   Neck:      Thyroid: No thyromegaly.      Trachea: No tracheal deviation.      Lymphadenopathy: No cervical adenopathy.   Pulmonary:      Effort: Pulmonary effort is normal. No respiratory distress.      Breath sounds: Normal breath sounds. No stridor.   Chest:      Chest wall: Not tender to palpatation.   Cardiovascular:      Normal rate. Regular rhythm.      Murmurs: There is no murmur.      . No S3 gallop. No click. No rub.   Pulses:     Intact distal pulses.   Abdominal:      General: Bowel sounds are normal. There is no distension.      Palpations: Abdomen is soft. There is no abdominal mass.      Tenderness: There is no abdominal tenderness. There is no guarding or rebound.   Musculoskeletal: Normal range of motion.         General: No tenderness or deformity.      Cervical back: Normal range of motion and neck supple. Skin:     General: Skin is warm and dry.      Findings: No erythema or rash.   Neurological:      Mental Status: Alert and oriented to person, place, and time.      Deep Tendon Reflexes: Reflexes are normal and symmetric.   Psychiatric:         Thought Content: Thought content normal.       Data and records reviewed:     Lab Results "   Component Value Date    GLUCOSE 123 (H) 08/23/2022    BUN 17 08/23/2022    CREATININE 1.02 08/23/2022    EGFRIFNONA 65 02/23/2022    EGFRIFAFRI 75 02/23/2022    BCR 17 08/23/2022    K 4.9 08/23/2022    CO2 22 08/23/2022    CALCIUM 9.3 08/23/2022    PROTENTOTREF 6.7 08/23/2022    ALBUMIN 4.2 08/23/2022    LABIL2 1.7 08/23/2022    AST 23 08/23/2022    ALT 22 08/23/2022     No results found for: CHOL  Lab Results   Component Value Date    TRIG 112 08/23/2022    TRIG 136 02/23/2022     Lab Results   Component Value Date    HDL 31 (L) 08/23/2022    HDL 33 (L) 02/23/2022     Lab Results   Component Value Date    LDL 66 08/23/2022     (H) 02/23/2022     Lab Results   Component Value Date    VLDL 21 08/23/2022    VLDL 25 02/23/2022     No results found for: LDLHDL  CBC          8/23/2022    10:28   CBC   WBC 7.7    RBC 4.88    Hemoglobin 13.4    Hematocrit 41.1    MCV 84    MCH 27.5    MCHC 32.6    RDW 14.2    Platelets 398      No radiology results for the last 90 days.          Assessment:          Diagnosis Plan   1. History of coronary artery stent placement  ECG 12 Lead    atorvastatin (LIPITOR) 20 MG tablet    losartan (COZAAR) 100 MG tablet    metoprolol succinate XL (TOPROL-XL) 25 MG 24 hr tablet      2. Atherosclerosis of native coronary artery of native heart, unspecified whether angina present  ECG 12 Lead    atorvastatin (LIPITOR) 20 MG tablet    losartan (COZAAR) 100 MG tablet    metoprolol succinate XL (TOPROL-XL) 25 MG 24 hr tablet      3. Benign essential hypertension  ECG 12 Lead    atorvastatin (LIPITOR) 20 MG tablet    losartan (COZAAR) 100 MG tablet    metoprolol succinate XL (TOPROL-XL) 25 MG 24 hr tablet      4. Mixed hyperlipidemia  ECG 12 Lead    atorvastatin (LIPITOR) 20 MG tablet    losartan (COZAAR) 100 MG tablet    metoprolol succinate XL (TOPROL-XL) 25 MG 24 hr tablet             Plan:       1.  Coronary disease, prior stent placement- no CP.  2.  Mixed hyperlipidemia, recent lipid  levels reviewed, incomplete control of his LDL, will increase his atorvastatin from 10 mg daily to 20 mg daily.  He states he does not need a prescription now because he just got 90 days and he will take 2 wrist tablets daily.  3.  Hypertension,        Thank you very much for allowing us to participate in the care of this pleasant patient.  Please don't hesitate to call if I can be of assistance in any way.      Current Outpatient Medications:   •  atorvastatin (LIPITOR) 20 MG tablet, Take 1 tablet by mouth Daily., Disp: 90 tablet, Rfl: 3  •  losartan (COZAAR) 100 MG tablet, Take 1 tablet by mouth Daily., Disp: 90 tablet, Rfl: 3  •  metoprolol succinate XL (TOPROL-XL) 25 MG 24 hr tablet, Take 1 tablet by mouth Daily., Disp: 90 tablet, Rfl: 3  •  promethazine-dextromethorphan (PROMETHAZINE-DM) 6.25-15 MG/5ML syrup, Take 5 mL by mouth 4 (Four) Times a Day As Needed for Cough., Disp: 180 mL, Rfl: 0         No follow-ups on file.

## 2023-09-19 ENCOUNTER — FLU SHOT (OUTPATIENT)
Dept: INTERNAL MEDICINE | Facility: CLINIC | Age: 78
End: 2023-09-19
Payer: MEDICARE

## 2023-09-19 DIAGNOSIS — Z23 NEED FOR INFLUENZA VACCINATION: Primary | ICD-10-CM

## 2023-09-19 PROCEDURE — G0008 ADMIN INFLUENZA VIRUS VAC: HCPCS | Performed by: NURSE PRACTITIONER

## 2023-09-19 PROCEDURE — 90662 IIV NO PRSV INCREASED AG IM: CPT | Performed by: NURSE PRACTITIONER

## 2023-11-13 ENCOUNTER — TELEPHONE (OUTPATIENT)
Dept: CARDIOLOGY | Facility: CLINIC | Age: 78
End: 2023-11-13
Payer: MEDICARE

## 2023-11-13 NOTE — TELEPHONE ENCOUNTER
We have received a fax for Mr. Dial for Surgery Clearance, for a Lesion Excisions (L) Lower eyelid on 2/21/2024.     Dr MARIO Yoon is wanting the fax signed. Fax left in inbox    LOV : 6/16/2023  NOV: 6/18/2024

## 2024-04-27 DIAGNOSIS — I25.10 ATHEROSCLEROSIS OF NATIVE CORONARY ARTERY OF NATIVE HEART, UNSPECIFIED WHETHER ANGINA PRESENT: ICD-10-CM

## 2024-04-27 DIAGNOSIS — I10 BENIGN ESSENTIAL HYPERTENSION: ICD-10-CM

## 2024-04-27 DIAGNOSIS — E78.2 MIXED HYPERLIPIDEMIA: ICD-10-CM

## 2024-04-27 DIAGNOSIS — Z95.5 HISTORY OF CORONARY ARTERY STENT PLACEMENT: Chronic | ICD-10-CM

## 2024-04-29 RX ORDER — LOSARTAN POTASSIUM 100 MG/1
100 TABLET ORAL DAILY
Qty: 90 TABLET | Refills: 1 | Status: SHIPPED | OUTPATIENT
Start: 2024-04-29

## 2024-04-29 RX ORDER — ATORVASTATIN CALCIUM 20 MG/1
20 TABLET, FILM COATED ORAL DAILY
Qty: 90 TABLET | Refills: 1 | Status: SHIPPED | OUTPATIENT
Start: 2024-04-29

## 2024-04-29 RX ORDER — METOPROLOL SUCCINATE 25 MG/1
25 TABLET, EXTENDED RELEASE ORAL DAILY
Qty: 90 TABLET | Refills: 1 | Status: SHIPPED | OUTPATIENT
Start: 2024-04-29

## 2024-06-18 ENCOUNTER — OFFICE VISIT (OUTPATIENT)
Dept: CARDIOLOGY | Facility: CLINIC | Age: 79
End: 2024-06-18
Payer: MEDICARE

## 2024-06-18 VITALS
OXYGEN SATURATION: 97 % | WEIGHT: 211 LBS | HEIGHT: 66 IN | DIASTOLIC BLOOD PRESSURE: 64 MMHG | BODY MASS INDEX: 33.91 KG/M2 | HEART RATE: 84 BPM | SYSTOLIC BLOOD PRESSURE: 120 MMHG

## 2024-06-18 DIAGNOSIS — I10 BENIGN ESSENTIAL HYPERTENSION: ICD-10-CM

## 2024-06-18 DIAGNOSIS — G47.33 OBSTRUCTIVE SLEEP APNEA SYNDROME: ICD-10-CM

## 2024-06-18 DIAGNOSIS — Z95.5 HISTORY OF CORONARY ARTERY STENT PLACEMENT: Primary | Chronic | ICD-10-CM

## 2024-06-18 DIAGNOSIS — E78.2 MIXED HYPERLIPIDEMIA: ICD-10-CM

## 2024-06-18 DIAGNOSIS — I25.10 ATHEROSCLEROSIS OF NATIVE CORONARY ARTERY OF NATIVE HEART, UNSPECIFIED WHETHER ANGINA PRESENT: ICD-10-CM

## 2024-06-18 PROCEDURE — 1159F MED LIST DOCD IN RCRD: CPT | Performed by: INTERNAL MEDICINE

## 2024-06-18 PROCEDURE — 93000 ELECTROCARDIOGRAM COMPLETE: CPT | Performed by: INTERNAL MEDICINE

## 2024-06-18 PROCEDURE — 3074F SYST BP LT 130 MM HG: CPT | Performed by: INTERNAL MEDICINE

## 2024-06-18 PROCEDURE — 99214 OFFICE O/P EST MOD 30 MIN: CPT | Performed by: INTERNAL MEDICINE

## 2024-06-18 PROCEDURE — 1160F RVW MEDS BY RX/DR IN RCRD: CPT | Performed by: INTERNAL MEDICINE

## 2024-06-18 PROCEDURE — 3078F DIAST BP <80 MM HG: CPT | Performed by: INTERNAL MEDICINE

## 2024-06-18 NOTE — PROGRESS NOTES
Subjective:     Encounter Date:06/18/24      Patient ID: Enzo Dial is a 79 y.o. male.    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    Doing great without any issues. Walks daily. He denies any chest pain, pressure, tightness, squeezing, or heartburn.  He has not experienced any feeling of palpitations, tachycardia or heart racing and no presyncope or syncope.  There has not been any problems with dizziness or lightheadedness.  There has not been any orthopnea or PND, and no problems with lower extremity edema.  He denies any shortness of breath at rest or with activity and has not had any wheezing.  He  has continued to perform daily activities of living without any specific problem or change in the level of activity.    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  While there is troponin was elevated.  He then had a subsequent cardiac catheterization and reportedly a stent was placed.      Cardiac catheterization from [place] was obtained, performed January 2017.  This demonstrated a 90% stenosis in the midsection of LAD, 70 to 80% stenosis just prior to that.  LAD was noted right heart.  Circumflex showed no significant disease, there is a 90% focal lesion second obtuse marginal is a very small vessel.  RCA is dominant.  No stenosis was seen.  Patient then had 2 drug-eluting stents placed in the LAD.    The following portions of the patient's history were reviewed and updated as appropriate: allergies, current medications, past family history, past medical history, past social history, past surgical history and problem list.      ECG 12 Lead    Date/Time: 6/18/2024 11:02 AM  Performed by: Montana Huggins III, MD    Authorized by: Montana Huggins III, MD  Comparison: compared with previous  "ECG   Similar to previous ECG  Rhythm: sinus rhythm  Rate: normal  Conduction: conduction normal  ST Segments: ST segments normal  T Waves: T waves normal  QRS axis: normal  Other: no other findings    Clinical impression: normal ECG             Objective:     Vitals:    06/18/24 1049   BP: 120/64   Pulse: 84   SpO2: 97%   Weight: 95.7 kg (211 lb)   Height: 167.6 cm (66\")     Body mass index is 34.06 kg/m².      Vitals reviewed.   Constitutional:       General: Not in acute distress.     Appearance: Well-developed. Not diaphoretic.   Eyes:      General:         Right eye: No discharge.         Left eye: No discharge.      Conjunctiva/sclera: Conjunctivae normal.      Pupils: Pupils are equal, round, and reactive to light.   HENT:      Head: Normocephalic and atraumatic.      Nose: Nose normal.   Neck:      Thyroid: No thyromegaly.      Trachea: No tracheal deviation.      Lymphadenopathy: No cervical adenopathy.   Pulmonary:      Effort: Pulmonary effort is normal. No respiratory distress.      Breath sounds: Normal breath sounds. No stridor.   Chest:      Chest wall: Not tender to palpatation.   Cardiovascular:      Normal rate. Regular rhythm.      Murmurs: There is no murmur.      . No S3 gallop. No click. No rub.   Pulses:     Intact distal pulses.   Abdominal:      General: Bowel sounds are normal. There is no distension.      Palpations: Abdomen is soft. There is no abdominal mass.      Tenderness: There is no abdominal tenderness. There is no guarding or rebound.   Musculoskeletal: Normal range of motion.         General: No tenderness or deformity.      Cervical back: Normal range of motion and neck supple. Skin:     General: Skin is warm and dry.      Findings: No erythema or rash.   Neurological:      Mental Status: Alert and oriented to person, place, and time.      Deep Tendon Reflexes: Reflexes are normal and symmetric.   Psychiatric:         Thought Content: Thought content normal.         Data and " "records reviewed:     Lab Results   Component Value Date    GLUCOSE 123 (H) 08/23/2022    BUN 17 08/23/2022    CREATININE 1.02 08/23/2022    EGFRIFNONA 65 02/23/2022    EGFRIFAFRI 75 02/23/2022    BCR 17 08/23/2022    K 4.9 08/23/2022    CO2 22 08/23/2022    CALCIUM 9.3 08/23/2022    PROTENTOTREF 6.7 08/23/2022    ALBUMIN 4.2 08/23/2022    LABIL2 1.7 08/23/2022    AST 23 08/23/2022    ALT 22 08/23/2022     No results found for: \"CHOL\"  Lab Results   Component Value Date    TRIG 112 08/23/2022    TRIG 136 02/23/2022     Lab Results   Component Value Date    HDL 31 (L) 08/23/2022    HDL 33 (L) 02/23/2022     Lab Results   Component Value Date    LDL 66 08/23/2022     (H) 02/23/2022     Lab Results   Component Value Date    VLDL 21 08/23/2022    VLDL 25 02/23/2022     No results found for: \"LDLHDL\"      No radiology results for the last 90 days.          Assessment:          Diagnosis Plan   1. History of coronary artery stent placement  ECG 12 Lead      2. Atherosclerosis of native coronary artery of native heart, unspecified whether angina present  ECG 12 Lead      3. Benign essential hypertension  ECG 12 Lead      4. Obstructive sleep apnea syndrome  ECG 12 Lead      5. Mixed hyperlipidemia  ECG 12 Lead             Plan:       1.  Coronary disease, prior stent placement- no CP. Continue GDMT  2.  Mixed hyperlipidemia, no recent lipid levels, to see you soon for labs  3.  Hypertension, well controlled, cont current medical therapy, Cr, BUN reviewed        Thank you very much for allowing us to participate in the care of this pleasant patient.  Please don't hesitate to call if I can be of assistance in any way.      Current Outpatient Medications:     atorvastatin (LIPITOR) 20 MG tablet, TAKE 1 TABLET BY MOUTH EVERY DAY, Disp: 90 tablet, Rfl: 1    losartan (COZAAR) 100 MG tablet, TAKE 1 TABLET BY MOUTH EVERY DAY, Disp: 90 tablet, Rfl: 1    metoprolol succinate XL (TOPROL-XL) 25 MG 24 hr tablet, TAKE 1 TABLET BY " MOUTH EVERY DAY, Disp: 90 tablet, Rfl: 1         Return in about 1 year (around 6/18/2025).

## 2024-10-20 DIAGNOSIS — I25.10 ATHEROSCLEROSIS OF NATIVE CORONARY ARTERY OF NATIVE HEART, UNSPECIFIED WHETHER ANGINA PRESENT: ICD-10-CM

## 2024-10-20 DIAGNOSIS — I10 BENIGN ESSENTIAL HYPERTENSION: ICD-10-CM

## 2024-10-20 DIAGNOSIS — E78.2 MIXED HYPERLIPIDEMIA: ICD-10-CM

## 2024-10-20 DIAGNOSIS — Z95.5 HISTORY OF CORONARY ARTERY STENT PLACEMENT: Chronic | ICD-10-CM

## 2024-10-21 RX ORDER — LOSARTAN POTASSIUM 100 MG/1
100 TABLET ORAL DAILY
Qty: 90 TABLET | Refills: 0 | Status: SHIPPED | OUTPATIENT
Start: 2024-10-21

## 2024-10-21 RX ORDER — METOPROLOL SUCCINATE 25 MG/1
25 TABLET, EXTENDED RELEASE ORAL DAILY
Qty: 90 TABLET | Refills: 1 | Status: SHIPPED | OUTPATIENT
Start: 2024-10-21

## 2024-10-21 RX ORDER — ATORVASTATIN CALCIUM 20 MG/1
20 TABLET, FILM COATED ORAL DAILY
Qty: 90 TABLET | Refills: 0 | Status: SHIPPED | OUTPATIENT
Start: 2024-10-21

## 2025-01-29 DIAGNOSIS — I25.10 ATHEROSCLEROSIS OF NATIVE CORONARY ARTERY OF NATIVE HEART, UNSPECIFIED WHETHER ANGINA PRESENT: ICD-10-CM

## 2025-01-29 DIAGNOSIS — E78.2 MIXED HYPERLIPIDEMIA: ICD-10-CM

## 2025-01-29 DIAGNOSIS — Z95.5 HISTORY OF CORONARY ARTERY STENT PLACEMENT: Chronic | ICD-10-CM

## 2025-01-29 DIAGNOSIS — I10 BENIGN ESSENTIAL HYPERTENSION: ICD-10-CM

## 2025-01-29 RX ORDER — LOSARTAN POTASSIUM 100 MG/1
100 TABLET ORAL DAILY
Qty: 90 TABLET | Refills: 0 | OUTPATIENT
Start: 2025-01-29

## 2025-01-29 RX ORDER — ATORVASTATIN CALCIUM 20 MG/1
20 TABLET, FILM COATED ORAL DAILY
Qty: 90 TABLET | Refills: 0 | OUTPATIENT
Start: 2025-01-29

## 2025-01-29 NOTE — TELEPHONE ENCOUNTER
Caller: Enzo Dial    Relationship: Self    Best call back number: [phone]    Requested Prescriptions:   Requested Prescriptions     Pending Prescriptions Disp Refills    atorvastatin (LIPITOR) 20 MG tablet 90 tablet 0     Sig: Take 1 tablet by mouth Daily.    losartan (COZAAR) 100 MG tablet 90 tablet 0     Sig: Take 1 tablet by mouth Daily.        Pharmacy where request should be sent: [facility] [id number] [place] [address] - [phone]  - [phone] FX     Last office visit with prescribing clinician: 4/3/2023   Last telemedicine visit with prescribing clinician: Visit date not found   Next office visit with prescribing clinician: Visit date not found     Additional details provided by patient: HAS 3 DAYS OF MEDICATION     Does the patient have less than a 3 day supply:  [x] Yes  [] No      Marlene Baron Rep   01/29/25 10:33 EST

## 2025-01-29 NOTE — TELEPHONE ENCOUNTER
HUB TO RELAY.      Pt has not been seen by FRANKIE Rodriguez since 4/3/2023. These medications were Rx'd by KEE Jesus.

## 2025-02-04 ENCOUNTER — TELEPHONE (OUTPATIENT)
Dept: INTERNAL MEDICINE | Facility: CLINIC | Age: 80
End: 2025-02-04
Payer: MEDICARE

## 2025-02-10 ENCOUNTER — OFFICE VISIT (OUTPATIENT)
Dept: INTERNAL MEDICINE | Facility: CLINIC | Age: 80
End: 2025-02-10
Payer: MEDICARE

## 2025-02-10 VITALS
HEIGHT: 66 IN | TEMPERATURE: 97.6 F | WEIGHT: 211 LBS | HEART RATE: 75 BPM | BODY MASS INDEX: 33.91 KG/M2 | SYSTOLIC BLOOD PRESSURE: 120 MMHG | OXYGEN SATURATION: 98 % | DIASTOLIC BLOOD PRESSURE: 70 MMHG

## 2025-02-10 DIAGNOSIS — I10 BENIGN ESSENTIAL HYPERTENSION: Primary | ICD-10-CM

## 2025-02-10 DIAGNOSIS — E78.2 MIXED HYPERLIPIDEMIA: ICD-10-CM

## 2025-02-10 DIAGNOSIS — I25.10 ATHEROSCLEROSIS OF NATIVE CORONARY ARTERY OF NATIVE HEART, UNSPECIFIED WHETHER ANGINA PRESENT: ICD-10-CM

## 2025-02-10 DIAGNOSIS — Z95.5 HISTORY OF CORONARY ARTERY STENT PLACEMENT: Chronic | ICD-10-CM

## 2025-02-10 PROCEDURE — 3074F SYST BP LT 130 MM HG: CPT | Performed by: NURSE PRACTITIONER

## 2025-02-10 PROCEDURE — 1159F MED LIST DOCD IN RCRD: CPT | Performed by: NURSE PRACTITIONER

## 2025-02-10 PROCEDURE — 1160F RVW MEDS BY RX/DR IN RCRD: CPT | Performed by: NURSE PRACTITIONER

## 2025-02-10 PROCEDURE — 99213 OFFICE O/P EST LOW 20 MIN: CPT | Performed by: NURSE PRACTITIONER

## 2025-02-10 PROCEDURE — 3078F DIAST BP <80 MM HG: CPT | Performed by: NURSE PRACTITIONER

## 2025-02-10 PROCEDURE — 1126F AMNT PAIN NOTED NONE PRSNT: CPT | Performed by: NURSE PRACTITIONER

## 2025-02-10 RX ORDER — ATORVASTATIN CALCIUM 20 MG/1
20 TABLET, FILM COATED ORAL DAILY
Qty: 90 TABLET | Refills: 0 | Status: SHIPPED | OUTPATIENT
Start: 2025-02-10

## 2025-02-10 RX ORDER — METOPROLOL SUCCINATE 25 MG/1
25 TABLET, EXTENDED RELEASE ORAL DAILY
Qty: 90 TABLET | Refills: 0 | Status: SHIPPED | OUTPATIENT
Start: 2025-02-10

## 2025-02-10 RX ORDER — LOSARTAN POTASSIUM 100 MG/1
100 TABLET ORAL DAILY
Qty: 90 TABLET | Refills: 0 | Status: SHIPPED | OUTPATIENT
Start: 2025-02-10

## 2025-02-10 NOTE — PROGRESS NOTES
"Chief Complaint  Hypertension and Med Refill    Subjective        Enzo Dial presents to [facility]  History of Present Illness  He has not been seen in our office since 04/03/2023.He has never had a physical with our office. Follows [facility]ilogy, Dr. Montana Huggins, III who with KEE Jesus (cardiology) have been managing his anti-hypertensive medication and statin. No recent lipid panel in the EHR. Last was August 2022, which was within normal limits aside from mildly decreased HDL.       Objective   Vital Signs:  /70   Pulse 75   Temp 97.6 °F (36.4 °C)   Ht 167.6 cm (66\")   Wt 95.7 kg (211 lb)   SpO2 98%   BMI 34.06 kg/m²   Estimated body mass index is 34.06 kg/m² as calculated from the following:    Height as of this encounter: 167.6 cm (66\").    Weight as of this encounter: 95.7 kg (211 lb).       BMI is >= 30 and <35. (Class 1 Obesity). The following options were offered after discussion;: not discussed at today's office visit.       Physical Exam  Vitals and nursing note reviewed.   Constitutional:       General: He is not in acute distress.     Appearance: Normal appearance. He is obese. He is not ill-appearing, toxic-appearing or diaphoretic.   Cardiovascular:      Rate and Rhythm: Normal rate and regular rhythm.      Heart sounds: Normal heart sounds.   Pulmonary:      Effort: Pulmonary effort is normal.      Breath sounds: Normal breath sounds.   Neurological:      General: No focal deficit present.      Mental Status: He is alert and oriented to person, place, and time. Mental status is at baseline.        Result Review :                   Assessment and Plan   Patient is a pleasant 80 year-old with CAD hx coronary artery stent placement, hyperlipidemia, BPH, LIBRA here for medication refills for the following:          Diagnoses and all orders for this visit:    1. Benign essential hypertension (Primary)  -     Cancel: Lipid Panel  -     atorvastatin " (LIPITOR) 20 MG tablet; Take 1 tablet by mouth Daily.  Dispense: 90 tablet; Refill: 0  -     losartan (COZAAR) 100 MG tablet; Take 1 tablet by mouth Daily.  Dispense: 90 tablet; Refill: 0  -     metoprolol succinate XL (TOPROL-XL) 25 MG 24 hr tablet; Take 1 tablet by mouth Daily.  Dispense: 90 tablet; Refill: 0  -     Lipid Panel    2. History of coronary artery stent placement  -     atorvastatin (LIPITOR) 20 MG tablet; Take 1 tablet by mouth Daily.  Dispense: 90 tablet; Refill: 0  -     losartan (COZAAR) 100 MG tablet; Take 1 tablet by mouth Daily.  Dispense: 90 tablet; Refill: 0  -     metoprolol succinate XL (TOPROL-XL) 25 MG 24 hr tablet; Take 1 tablet by mouth Daily.  Dispense: 90 tablet; Refill: 0    3. Atherosclerosis of native coronary artery of native heart, unspecified whether angina present  Comments:  Continue to follow up with Dr. Huggins as directed.  Orders:  -     atorvastatin (LIPITOR) 20 MG tablet; Take 1 tablet by mouth Daily.  Dispense: 90 tablet; Refill: 0  -     losartan (COZAAR) 100 MG tablet; Take 1 tablet by mouth Daily.  Dispense: 90 tablet; Refill: 0  -     metoprolol succinate XL (TOPROL-XL) 25 MG 24 hr tablet; Take 1 tablet by mouth Daily.  Dispense: 90 tablet; Refill: 0    4. Mixed hyperlipidemia  -     atorvastatin (LIPITOR) 20 MG tablet; Take 1 tablet by mouth Daily.  Dispense: 90 tablet; Refill: 0  -     losartan (COZAAR) 100 MG tablet; Take 1 tablet by mouth Daily.  Dispense: 90 tablet; Refill: 0  -     metoprolol succinate XL (TOPROL-XL) 25 MG 24 hr tablet; Take 1 tablet by mouth Daily.  Dispense: 90 tablet; Refill: 0             Follow Up   Return in about 1 month (around 3/10/2025) for Annual physical.  Patient was given instructions and counseling regarding his condition or for health maintenance advice. Please see specific information pulled into the AVS if appropriate.

## 2025-02-11 LAB
CHOLEST SERPL-MCNC: 117 MG/DL (ref 100–199)
HDLC SERPL-MCNC: 33 MG/DL
LDLC SERPL CALC-MCNC: 70 MG/DL (ref 0–99)
TRIGL SERPL-MCNC: 64 MG/DL (ref 0–149)
VLDLC SERPL CALC-MCNC: 14 MG/DL (ref 5–40)

## 2025-03-10 ENCOUNTER — HOSPITAL ENCOUNTER (OUTPATIENT)
Dept: GENERAL RADIOLOGY | Facility: HOSPITAL | Age: 80
Discharge: HOME | End: 2025-03-10
Payer: MEDICARE

## 2025-03-10 ENCOUNTER — OFFICE VISIT (OUTPATIENT)
Dept: INTERNAL MEDICINE | Facility: CLINIC | Age: 80
End: 2025-03-10
Payer: MEDICARE

## 2025-03-10 VITALS
HEART RATE: 76 BPM | WEIGHT: 211 LBS | HEIGHT: 66 IN | BODY MASS INDEX: 33.91 KG/M2 | DIASTOLIC BLOOD PRESSURE: 70 MMHG | OXYGEN SATURATION: 98 % | SYSTOLIC BLOOD PRESSURE: 124 MMHG

## 2025-03-10 DIAGNOSIS — M25.552 BILATERAL HIP PAIN: ICD-10-CM

## 2025-03-10 DIAGNOSIS — N40.0 BENIGN PROSTATIC HYPERPLASIA WITHOUT URINARY OBSTRUCTION: ICD-10-CM

## 2025-03-10 DIAGNOSIS — M25.551 BILATERAL HIP PAIN: ICD-10-CM

## 2025-03-10 DIAGNOSIS — G89.29 CHRONIC BILATERAL LOW BACK PAIN WITHOUT SCIATICA: ICD-10-CM

## 2025-03-10 DIAGNOSIS — Z00.00 MEDICARE ANNUAL WELLNESS VISIT, SUBSEQUENT: Primary | ICD-10-CM

## 2025-03-10 DIAGNOSIS — I10 BENIGN ESSENTIAL HYPERTENSION: ICD-10-CM

## 2025-03-10 DIAGNOSIS — Z12.5 ENCOUNTER FOR SCREENING FOR MALIGNANT NEOPLASM OF PROSTATE: ICD-10-CM

## 2025-03-10 DIAGNOSIS — E78.2 MIXED HYPERLIPIDEMIA: ICD-10-CM

## 2025-03-10 DIAGNOSIS — M54.50 CHRONIC BILATERAL LOW BACK PAIN WITHOUT SCIATICA: ICD-10-CM

## 2025-03-10 PROCEDURE — 72100 X-RAY EXAM L-S SPINE 2/3 VWS: CPT

## 2025-03-10 PROCEDURE — 1160F RVW MEDS BY RX/DR IN RCRD: CPT | Performed by: NURSE PRACTITIONER

## 2025-03-10 PROCEDURE — 1159F MED LIST DOCD IN RCRD: CPT | Performed by: NURSE PRACTITIONER

## 2025-03-10 PROCEDURE — G0439 PPPS, SUBSEQ VISIT: HCPCS | Performed by: NURSE PRACTITIONER

## 2025-03-10 PROCEDURE — 1125F AMNT PAIN NOTED PAIN PRSNT: CPT | Performed by: NURSE PRACTITIONER

## 2025-03-10 PROCEDURE — 1170F FXNL STATUS ASSESSED: CPT | Performed by: NURSE PRACTITIONER

## 2025-03-10 PROCEDURE — 3078F DIAST BP <80 MM HG: CPT | Performed by: NURSE PRACTITIONER

## 2025-03-10 PROCEDURE — 73521 X-RAY EXAM HIPS BI 2 VIEWS: CPT

## 2025-03-10 PROCEDURE — 3074F SYST BP LT 130 MM HG: CPT | Performed by: NURSE PRACTITIONER

## 2025-03-10 NOTE — ASSESSMENT & PLAN NOTE
Lipid abnormalities are improving with treatment    Plan:  Continue same medication/s without change.      Discussed medication dosage, use, side effects, and goals of treatment in detail.    Counseled patient on lifestyle modifications to help control hyperlipidemia.     Patient Treatment Goals:   LDL goal is less than 70    Followup follow up with Dr. Huggins.

## 2025-03-10 NOTE — PROGRESS NOTES
Subjective   The ABCs of the Annual Wellness Visit  Medicare Wellness Visit      Enzo Dial is a 80 y.o. patient who presents for a Medicare Wellness Visit.    Seen in our office on February 10, 2025 for follow-up benign essential hypertension.  He currently follows Dr. Montana Huggins III and KEE Jesus, cardiology who have been managing his BP medication and statin.     Has BPH, which was diagnosed >20 years ago. Has not been evaluated by urology. He has chronic, increased urinary frequency, no incontinence issues.     The following portions of the patient's history were reviewed and   updated as appropriate: allergies, current medications, past family history, past medical history, past social history, past surgical history, and problem list.    Compared to one year ago, the patient's physical   health is worse. Is starting to have lower back pain. This has been occurring for about 2-3 months. Hurts to move. Will take OTC NSAID. Does help to relieve.   Compared to one year ago, the patient's mental   health is the same.    Recent Hospitalizations:  He was not admitted to the hospital during the last year.     Current Medical Providers:  Patient Care Team:  Zoë Castaneda APRN as PCP - General (Internal Medicine)    Outpatient Medications Prior to Visit   Medication Sig Dispense Refill    atorvastatin (LIPITOR) 20 MG tablet Take 1 tablet by mouth Daily. 90 tablet 0    losartan (COZAAR) 100 MG tablet Take 1 tablet by mouth Daily. 90 tablet 0    metoprolol succinate XL (TOPROL-XL) 25 MG 24 hr tablet Take 1 tablet by mouth Daily. 90 tablet 0     No facility-administered medications prior to visit.     No opioid medication identified on active medication list. I have reviewed chart for other potential  high risk medication/s and harmful drug interactions in the elderly.      Aspirin is not on active medication list.  Aspirin use is not indicated based on review of current medical condition/s. Risk of harm  "outweighs potential benefits.  .    Patient Active Problem List   Diagnosis    Benign essential hypertension    Benign prostatic hyperplasia without urinary obstruction    Coronary atherosclerosis    Hyperlipidemia    Obstructive sleep apnea syndrome    History of coronary artery stent placement     Advance Care Planning Advance Directive is not on file.  ACP discussion was held with the patient during this visit. Patient does not have an advance directive, information provided.            Objective   Vitals:    03/10/25 0818   BP: 124/70   Pulse: 76   SpO2: 98%   Weight: 95.7 kg (211 lb)   Height: 167.6 cm (66\")   PainSc: 2        Physical Exam  Vitals and nursing note reviewed.   Constitutional:       General: He is not in acute distress.     Appearance: Normal appearance. He is not ill-appearing, toxic-appearing or diaphoretic.   Cardiovascular:      Rate and Rhythm: Normal rate and regular rhythm.      Pulses: Normal pulses.      Heart sounds: Normal heart sounds.   Pulmonary:      Effort: Pulmonary effort is normal.      Breath sounds: Normal breath sounds.   Abdominal:      General: Bowel sounds are normal.      Palpations: Abdomen is soft.      Tenderness: There is no abdominal tenderness.   Musculoskeletal:      Lumbar back: No spasms, tenderness or bony tenderness. Decreased range of motion. Negative right straight leg raise test and negative left straight leg raise test.      Right hip: Decreased range of motion.      Left hip: Decreased range of motion.   Neurological:      General: No focal deficit present.      Mental Status: He is alert and oriented to person, place, and time. Mental status is at baseline.      Motor: No weakness.   Psychiatric:         Mood and Affect: Mood normal.         Behavior: Behavior normal.         Thought Content: Thought content normal.         Judgment: Judgment normal.          Estimated body mass index is 34.06 kg/m² as calculated from the following:    Height as of this " "encounter: 167.6 cm (66\").    Weight as of this encounter: 95.7 kg (211 lb).              Gait and Balance Evaluation:  Short Strides and Little or No Arm Swing     Does the patient have evidence of cognitive impairment? No  Lab Results   Component Value Date    CHLPL 117 02/10/2025    TRIG 64 02/10/2025    HDL 33 (L) 02/10/2025    LDL 70 02/10/2025    VLDL 14 02/10/2025                                                                                               Health  Risk Assessment    Smoking Status:  Social History     Tobacco Use   Smoking Status Never   Smokeless Tobacco Never     Alcohol Consumption:  Social History     Substance and Sexual Activity   Alcohol Use Not Currently       Fall Risk Screen  STEADI Fall Risk Assessment was completed, and patient is at LOW risk for falls.Assessment completed on:3/10/2025    Depression Screening   Little interest or pleasure in doing things? Not at all   Feeling down, depressed, or hopeless? Not at all   PHQ-2 Total Score 0      Health Habits and Functional and Cognitive Screening:      3/10/2025     8:21 AM   Functional & Cognitive Status   Do you have difficulty preparing food and eating? No   Do you have difficulty bathing yourself, getting dressed or grooming yourself? No   Do you have difficulty using the toilet? No   Do you have difficulty moving around from place to place? No   Do you have trouble with steps or getting out of a bed or a chair? No   Current Diet Well Balanced Diet   Dental Exam Up to date   Eye Exam Up to date   Exercise (times per week) 2 times per week   Current Exercises Include No Regular Exercise;Walking   Do you need help using the phone?  No   Are you deaf or do you have serious difficulty hearing?  No   Do you need help to go to places out of walking distance? No   Do you need help shopping? No   Do you need help preparing meals?  No   Do you need help with housework?  No   Do you need help with laundry? No   Do you need help taking " your medications? No   Do you need help managing money? No   Do you ever drive or ride in a car without wearing a seat belt? No   Have you felt unusual stress, anger or loneliness in the last month? No   Who do you live with? Spouse   If you need help, do you have trouble finding someone available to you? No   Have you been bothered in the last four weeks by sexual problems? No   Do you have difficulty concentrating, remembering or making decisions? No           Age-appropriate Screening Schedule:  Refer to the list below for future screening recommendations based on patient's age, sex and/or medical conditions. Orders for these recommended tests are listed in the plan section. The patient has been provided with a written plan.    Health Maintenance List  Health Maintenance   Topic Date Due    COVID-19 Vaccine (8 - 2024-25 season) 02/28/2025    ZOSTER VACCINE (1 of 2) 03/10/2025 (Originally 1/22/1995)    LIPID PANEL  02/10/2026    BMI FOLLOWUP  02/10/2026    ANNUAL WELLNESS VISIT  03/10/2026    TDAP/TD VACCINES (2 - Td or Tdap) 01/01/2027    RSV Vaccine - Adults  Completed    INFLUENZA VACCINE  Completed    Pneumococcal Vaccine 50+  Completed                                                                                                                                                CMS Preventative Services Quick Reference  Risk Factors Identified During Encounter  None Identified    The above risks/problems have been discussed with the patient.  Pertinent information has been shared with the patient in the After Visit Summary.  An After Visit Summary and PPPS were made available to the patient.    Follow Up:   Next Medicare Wellness visit to be scheduled in 1 year.     Assessment & Plan  Medicare annual wellness visit, subsequent    Orders:    CBC & Differential    Comprehensive Metabolic Panel    Benign essential hypertension      Orders:    Urinalysis With Microscopic - Urine, Clean Catch    Mixed  hyperlipidemia   Lipid abnormalities are improving with treatment    Plan:  Continue same medication/s without change.      Discussed medication dosage, use, side effects, and goals of treatment in detail.    Counseled patient on lifestyle modifications to help control hyperlipidemia.     Patient Treatment Goals:   LDL goal is less than 70    Followup  follow up with Dr. Huggins .         Benign prostatic hyperplasia without urinary obstruction    Orders:    PSA Screen    Encounter for screening for malignant neoplasm of prostate    Orders:    PSA Screen    Chronic bilateral low back pain without sciatica    Orders:    XR Spine Lumbar AP & Lateral; Future    Bilateral hip pain    Orders:    XR Hips Bilateral With or Without Pelvis 2 View; Future         Follow Up:   Return in about 1 year (around 3/10/2026) for Medicare Wellness.

## 2025-03-11 ENCOUNTER — RESULTS FOLLOW-UP (OUTPATIENT)
Dept: INTERNAL MEDICINE | Facility: CLINIC | Age: 80
End: 2025-03-11
Payer: MEDICARE

## 2025-03-11 DIAGNOSIS — R73.09 ELEVATED GLUCOSE: Primary | ICD-10-CM

## 2025-03-11 DIAGNOSIS — R74.8 ELEVATED ALKALINE PHOSPHATASE LEVEL: ICD-10-CM

## 2025-03-11 LAB
ALBUMIN SERPL-MCNC: 3.8 G/DL (ref 3.8–4.8)
ALP SERPL-CCNC: 135 IU/L (ref 44–121)
ALT SERPL-CCNC: 19 IU/L (ref 0–44)
APPEARANCE UR: CLEAR
AST SERPL-CCNC: 18 IU/L (ref 0–40)
BACTERIA #/AREA URNS HPF: NORMAL /[HPF]
BASOPHILS # BLD AUTO: 0.1 X10E3/UL (ref 0–0.2)
BASOPHILS NFR BLD AUTO: 1 %
BILIRUB SERPL-MCNC: 0.4 MG/DL (ref 0–1.2)
BILIRUB UR QL STRIP: NEGATIVE
BUN SERPL-MCNC: 19 MG/DL (ref 8–27)
BUN/CREAT SERPL: 18 (ref 10–24)
CALCIUM SERPL-MCNC: 9.2 MG/DL (ref 8.6–10.2)
CASTS URNS QL MICRO: NORMAL /LPF
CHLORIDE SERPL-SCNC: 104 MMOL/L (ref 96–106)
CO2 SERPL-SCNC: 22 MMOL/L (ref 20–29)
COLOR UR: YELLOW
CREAT SERPL-MCNC: 1.07 MG/DL (ref 0.76–1.27)
EGFRCR SERPLBLD CKD-EPI 2021: 70 ML/MIN/1.73
EOSINOPHIL # BLD AUTO: 0.3 X10E3/UL (ref 0–0.4)
EOSINOPHIL NFR BLD AUTO: 3 %
EPI CELLS #/AREA URNS HPF: NORMAL /HPF (ref 0–10)
ERYTHROCYTE [DISTWIDTH] IN BLOOD BY AUTOMATED COUNT: 12.3 % (ref 11.6–15.4)
GLOBULIN SER CALC-MCNC: 3.3 G/DL (ref 1.5–4.5)
GLUCOSE SERPL-MCNC: 114 MG/DL (ref 70–99)
GLUCOSE UR QL STRIP: NEGATIVE
HCT VFR BLD AUTO: 44.7 % (ref 37.5–51)
HGB BLD-MCNC: 14.6 G/DL (ref 13–17.7)
HGB UR QL STRIP: NEGATIVE
IMM GRANULOCYTES # BLD AUTO: 0 X10E3/UL (ref 0–0.1)
IMM GRANULOCYTES NFR BLD AUTO: 0 %
KETONES UR QL STRIP: NEGATIVE
LEUKOCYTE ESTERASE UR QL STRIP: NEGATIVE
LYMPHOCYTES # BLD AUTO: 2.8 X10E3/UL (ref 0.7–3.1)
LYMPHOCYTES NFR BLD AUTO: 31 %
MCH RBC QN AUTO: 29.6 PG (ref 26.6–33)
MCHC RBC AUTO-ENTMCNC: 32.7 G/DL (ref 31.5–35.7)
MCV RBC AUTO: 91 FL (ref 79–97)
MICRO URNS: NORMAL
MICRO URNS: NORMAL
MONOCYTES # BLD AUTO: 1.2 X10E3/UL (ref 0.1–0.9)
MONOCYTES NFR BLD AUTO: 13 %
NEUTROPHILS # BLD AUTO: 4.8 X10E3/UL (ref 1.4–7)
NEUTROPHILS NFR BLD AUTO: 52 %
NITRITE UR QL STRIP: NEGATIVE
PH UR STRIP: 5.5 [PH] (ref 5–7.5)
PLATELET # BLD AUTO: 428 X10E3/UL (ref 150–450)
POTASSIUM SERPL-SCNC: 4.9 MMOL/L (ref 3.5–5.2)
PROT SERPL-MCNC: 7.1 G/DL (ref 6–8.5)
PROT UR QL STRIP: NEGATIVE
PSA SERPL-MCNC: 0.8 NG/ML (ref 0–4)
RBC # BLD AUTO: 4.93 X10E6/UL (ref 4.14–5.8)
RBC #/AREA URNS HPF: NORMAL /HPF (ref 0–2)
SODIUM SERPL-SCNC: 142 MMOL/L (ref 134–144)
SP GR UR STRIP: 1.02 (ref 1–1.03)
UROBILINOGEN UR STRIP-MCNC: 1 MG/DL (ref 0.2–1)
WBC # BLD AUTO: 9.1 X10E3/UL (ref 3.4–10.8)
WBC #/AREA URNS HPF: NORMAL /HPF (ref 0–5)

## 2025-03-12 ENCOUNTER — RESULTS FOLLOW-UP (OUTPATIENT)
Dept: GENERAL RADIOLOGY | Facility: HOSPITAL | Age: 80
End: 2025-03-12
Payer: MEDICARE

## 2025-03-14 ENCOUNTER — RESULTS FOLLOW-UP (OUTPATIENT)
Dept: INTERNAL MEDICINE | Facility: CLINIC | Age: 80
End: 2025-03-14
Payer: MEDICARE

## 2025-03-14 DIAGNOSIS — R74.8 ELEVATED ALKALINE PHOSPHATASE LEVEL: Primary | ICD-10-CM

## 2025-03-19 ENCOUNTER — TELEPHONE (OUTPATIENT)
Dept: INTERNAL MEDICINE | Facility: CLINIC | Age: 80
End: 2025-03-19

## 2025-03-19 NOTE — TELEPHONE ENCOUNTER
Caller: Enzo Dial    Relationship: Self    Best call back number: [phone]     Caller requesting test results: XRAY AND LABS    When was the test performed: 3.10.25, AND 3.13.25    Where was the test performed: [facility], AND LABS WERE DONE IN OFFICE    Additional notes:   PLEASE CALL PATIENT TO GO OVER XRAY AND LABS. PATIENT HAS NOT HEARD ANYTHING.    THANK YOU

## 2025-03-21 DIAGNOSIS — M25.552 BILATERAL HIP PAIN: Primary | ICD-10-CM

## 2025-03-21 DIAGNOSIS — M25.551 BILATERAL HIP PAIN: Primary | ICD-10-CM

## 2025-05-01 ENCOUNTER — TELEPHONE (OUTPATIENT)
Dept: CARDIOLOGY | Age: 80
End: 2025-05-01

## 2025-05-01 NOTE — TELEPHONE ENCOUNTER
Caller: Enzo Dial    Relationship to patient: Self    Best call back number: [phone]    Patient is needing: PT IS REQUESTING A SOONER APPT. PHYSICAL THERAPY HAS SAID THAT PT SEEMS MORE FATIGUED THAN NORMAL AND NEEDS TO BE SEEN SOONER THAN HIS 06.18.2025. PLEASE CALL TO SCHEDULE

## 2025-05-05 DIAGNOSIS — E78.2 MIXED HYPERLIPIDEMIA: ICD-10-CM

## 2025-05-05 DIAGNOSIS — I25.10 ATHEROSCLEROSIS OF NATIVE CORONARY ARTERY OF NATIVE HEART, UNSPECIFIED WHETHER ANGINA PRESENT: ICD-10-CM

## 2025-05-05 DIAGNOSIS — Z95.5 HISTORY OF CORONARY ARTERY STENT PLACEMENT: Chronic | ICD-10-CM

## 2025-05-05 DIAGNOSIS — I10 BENIGN ESSENTIAL HYPERTENSION: ICD-10-CM

## 2025-05-05 RX ORDER — ATORVASTATIN CALCIUM 20 MG/1
20 TABLET, FILM COATED ORAL DAILY
Qty: 90 TABLET | Refills: 0 | Status: SHIPPED | OUTPATIENT
Start: 2025-05-05 | End: 2025-05-06 | Stop reason: SDUPTHER

## 2025-05-06 ENCOUNTER — TELEPHONE (OUTPATIENT)
Dept: GASTROENTEROLOGY | Facility: CLINIC | Age: 80
End: 2025-05-06
Payer: MEDICARE

## 2025-05-06 ENCOUNTER — RESULTS FOLLOW-UP (OUTPATIENT)
Dept: INTERNAL MEDICINE | Facility: CLINIC | Age: 80
End: 2025-05-06

## 2025-05-06 ENCOUNTER — HOSPITAL ENCOUNTER (OUTPATIENT)
Dept: CARDIOLOGY | Facility: HOSPITAL | Age: 80
Discharge: HOME | End: 2025-05-06
Admitting: NURSE PRACTITIONER
Payer: MEDICARE

## 2025-05-06 ENCOUNTER — OFFICE VISIT (OUTPATIENT)
Dept: INTERNAL MEDICINE | Facility: CLINIC | Age: 80
End: 2025-05-06
Payer: MEDICARE

## 2025-05-06 ENCOUNTER — OFFICE VISIT (OUTPATIENT)
Dept: CARDIOLOGY | Age: 80
End: 2025-05-06
Payer: MEDICARE

## 2025-05-06 VITALS
SYSTOLIC BLOOD PRESSURE: 132 MMHG | WEIGHT: 209 LBS | BODY MASS INDEX: 33.59 KG/M2 | HEART RATE: 103 BPM | OXYGEN SATURATION: 95 % | DIASTOLIC BLOOD PRESSURE: 80 MMHG | HEIGHT: 66 IN

## 2025-05-06 VITALS
SYSTOLIC BLOOD PRESSURE: 124 MMHG | BODY MASS INDEX: 33.27 KG/M2 | OXYGEN SATURATION: 95 % | DIASTOLIC BLOOD PRESSURE: 82 MMHG | HEART RATE: 74 BPM | WEIGHT: 207 LBS | HEIGHT: 66 IN

## 2025-05-06 DIAGNOSIS — I25.10 ATHEROSCLEROSIS OF NATIVE CORONARY ARTERY OF NATIVE HEART, UNSPECIFIED WHETHER ANGINA PRESENT: ICD-10-CM

## 2025-05-06 DIAGNOSIS — I10 BENIGN ESSENTIAL HYPERTENSION: ICD-10-CM

## 2025-05-06 DIAGNOSIS — I50.31 ACUTE DIASTOLIC CHF (CONGESTIVE HEART FAILURE): ICD-10-CM

## 2025-05-06 DIAGNOSIS — I11.0 HYPERTENSIVE HEART DISEASE WITH HEART FAILURE: ICD-10-CM

## 2025-05-06 DIAGNOSIS — G47.33 OBSTRUCTIVE SLEEP APNEA SYNDROME: ICD-10-CM

## 2025-05-06 DIAGNOSIS — E78.2 MIXED HYPERLIPIDEMIA: ICD-10-CM

## 2025-05-06 DIAGNOSIS — R74.8 ELEVATED ALKALINE PHOSPHATASE LEVEL: ICD-10-CM

## 2025-05-06 DIAGNOSIS — I25.10 ATHEROSCLEROSIS OF NATIVE CORONARY ARTERY OF NATIVE HEART WITHOUT ANGINA PECTORIS: ICD-10-CM

## 2025-05-06 DIAGNOSIS — Z95.5 HISTORY OF CORONARY ARTERY STENT PLACEMENT: Chronic | ICD-10-CM

## 2025-05-06 DIAGNOSIS — I44.0 FIRST DEGREE AV BLOCK: ICD-10-CM

## 2025-05-06 DIAGNOSIS — I10 PRIMARY HYPERTENSION: ICD-10-CM

## 2025-05-06 DIAGNOSIS — E11.9 TYPE 2 DIABETES MELLITUS WITHOUT COMPLICATION, WITHOUT LONG-TERM CURRENT USE OF INSULIN: ICD-10-CM

## 2025-05-06 DIAGNOSIS — E11.9 TYPE 2 DIABETES MELLITUS WITHOUT COMPLICATION, WITHOUT LONG-TERM CURRENT USE OF INSULIN: Primary | ICD-10-CM

## 2025-05-06 DIAGNOSIS — E66.811 CLASS 1 OBESITY WITH SERIOUS COMORBIDITY AND BODY MASS INDEX (BMI) OF 33.0 TO 33.9 IN ADULT, UNSPECIFIED OBESITY TYPE: ICD-10-CM

## 2025-05-06 DIAGNOSIS — R60.0 LOWER EXTREMITY EDEMA: ICD-10-CM

## 2025-05-06 DIAGNOSIS — R60.0 LOWER EXTREMITY EDEMA: Primary | ICD-10-CM

## 2025-05-06 DIAGNOSIS — R76.8 ANTIMITOCHONDRIAL ANTIBODY POSITIVE: ICD-10-CM

## 2025-05-06 LAB
HBA1C MFR BLD: 6.8 % (ref 4.8–5.6)
NT-PROBNP SERPL-MCNC: 110 PG/ML (ref 0–1800)
T-UPTAKE NFR SERPL: 1.01 TBI (ref 0.8–1.3)
T4 SERPL-MCNC: 6.49 MCG/DL (ref 4.5–11.7)
TSH SERPL DL<=0.05 MIU/L-ACNC: 3.93 UIU/ML (ref 0.27–4.2)

## 2025-05-06 PROCEDURE — 83880 ASSAY OF NATRIURETIC PEPTIDE: CPT | Performed by: NURSE PRACTITIONER

## 2025-05-06 PROCEDURE — 84479 ASSAY OF THYROID (T3 OR T4): CPT | Performed by: NURSE PRACTITIONER

## 2025-05-06 PROCEDURE — 84436 ASSAY OF TOTAL THYROXINE: CPT | Performed by: NURSE PRACTITIONER

## 2025-05-06 PROCEDURE — 36415 COLL VENOUS BLD VENIPUNCTURE: CPT

## 2025-05-06 PROCEDURE — 83036 HEMOGLOBIN GLYCOSYLATED A1C: CPT | Performed by: NURSE PRACTITIONER

## 2025-05-06 PROCEDURE — 84443 ASSAY THYROID STIM HORMONE: CPT | Performed by: NURSE PRACTITIONER

## 2025-05-06 RX ORDER — METOPROLOL SUCCINATE 25 MG/1
25 TABLET, EXTENDED RELEASE ORAL DAILY
Qty: 90 TABLET | Refills: 3 | Status: SHIPPED | OUTPATIENT
Start: 2025-05-06

## 2025-05-06 RX ORDER — LOSARTAN POTASSIUM 100 MG/1
100 TABLET ORAL DAILY
Qty: 90 TABLET | Refills: 3 | Status: SHIPPED | OUTPATIENT
Start: 2025-05-06

## 2025-05-06 RX ORDER — ASPIRIN 81 MG/1
81 TABLET ORAL DAILY
Start: 2025-05-06 | End: 2025-05-07

## 2025-05-06 RX ORDER — CICLOPIROX 80 MG/ML
SOLUTION TOPICAL
COMMUNITY
Start: 2025-03-31

## 2025-05-06 RX ORDER — ATORVASTATIN CALCIUM 20 MG/1
20 TABLET, FILM COATED ORAL DAILY
Qty: 90 TABLET | Refills: 3 | Status: SHIPPED | OUTPATIENT
Start: 2025-05-06

## 2025-05-06 NOTE — TELEPHONE ENCOUNTER
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  Will need to be further assessed and start medication.  Does not matter who he sees.Rosemary

## 2025-05-06 NOTE — PROGRESS NOTES
Date of Office Visit: 2025  Encounter Provider: KEE Daniels  Place of Service: [facility] CARDIOLOG[facility]  Patient Name: Enzo Dial  :1945  Primary Cardiologist: Dr. 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  He is a new patient to me and I have reviewed his medical records.  He is a retired OB/GYN.    He has known hypertension hyperlipidemia, obesity, and obstructive sleep apnea.  He reports degenerative back issues.    In 2017, in [place], he was admitted for urosepsis and troponin level was elevated.  He had a cardiac catheterization completed which showed left main normal, 90% mid LAD stenosis, 70D to 80% mid to distal LAD stenosis, no significant disease in circumflex, 90% focal lesion in the second obtuse marginal (small vessel), and RCA dominant normal.  He underwent drug-eluting stent placement x 2 to the LAD.    He presents today for evaluation of lower extremity edema.  He has been working with his physical therapist who recommended that he follow-up with his cardiologist about lower extremity edema.  He says the lower extremity edema has been there for about a year and is not bothersome.  He reports occasional lightheadedness with position changes.  He denies chest pain, shortness of air, palpitations, syncope, or bleeding.  His blood pressure and heart rate are normal today.  I reviewed his blood work.      Past Medical History:   Diagnosis Date    Coronary artery disease 2017    s/p stent placement x 2 to LAD    History of coronary artery stent placement 2022    Hyperlipidemia     Hypertension        Past Surgical History:   Procedure Laterality Date    BELPHAROPTOSIS REPAIR Left 2024    CARDIAC CATHETERIZATION      COLONOSCOPY      CORONARY ANGIOPLASTY WITH STENT PLACEMENT      NOSE SURGERY         Social  "History     Socioeconomic History    Marital status:    Tobacco Use    Smoking status: Never    Smokeless tobacco: Never   Vaping Use    Vaping status: Never Used   Substance and Sexual Activity    Alcohol use: Not Currently    Drug use: Never    Sexual activity: Never       Family History   Problem Relation Age of Onset    Stroke Mother     Hypertension Mother     Heart attack Father 47    Hypertension Father        The following portion of the patient's history were reviewed and updated as appropriate: past medical history, past surgical history, past social history, past family history, allergies, current medications, and problem list.    Review of Systems   Constitutional: Positive for weight gain.   Cardiovascular:  Positive for leg swelling.   Respiratory: Negative.     Neurological:  Positive for light-headedness.       No Known Allergies      Current Outpatient Medications:     atorvastatin (LIPITOR) 20 MG tablet, Take 1 tablet by mouth Daily., Disp: 90 tablet, Rfl: 3    ciclopirox (PENLAC) 8 % solution, APPLY TOPICALLY ONCE A DAY AT BEDTIME, Disp: , Rfl:     losartan (COZAAR) 100 MG tablet, Take 1 tablet by mouth Daily., Disp: 90 tablet, Rfl: 3    metoprolol succinate XL (TOPROL-XL) 25 MG 24 hr tablet, Take 1 tablet by mouth Daily., Disp: 90 tablet, Rfl: 3         Objective:     Vitals:    05/06/25 1511   BP: 124/82   BP Location: Left arm   Patient Position: Sitting   Cuff Size: Adult   Pulse: 74   SpO2: 95%   Weight: 93.9 kg (207 lb)   Height: 167.6 cm (65.98\")     Body mass index is 33.43 kg/m².    PHYSICAL EXAM:    Vitals Reviewed.   General Appearance: No acute distress, well developed and well nourished. Obese.   Eyes: Glasses.   HENT: No hearing loss noted.    Respiratory: No signs of respiratory distress. Respiration rhythm and depth normal.  Clear to auscultation.   Cardiovascular:  Jugular Venous Pressure: Normal  Heart Rate and Rhythm: Normal, Heart Sounds: Normal S1 and S2. No S3 or S4 " noted.  Murmurs: No murmurs noted. No rubs, thrills, or gallops.   Lower Extremities: Bilateral +1 pitting lower extremity edema noted.  Musculoskeletal: Normal movement of extremities.  Skin: General appearance normal.    Psychiatric: Patient alert and oriented to person, place, and time. Speech and behavior appropriate. Normal mood and affect.       ECG 12 Lead    Date/Time: 5/6/2025 3:18 PM  Performed by: Jeana Chapin APRN    Authorized by: Jeana Chapin APRN  Comparison: compared with previous ECG from 6/18/2024  Similar to previous ECG  Rhythm: sinus rhythm  Rate: normal  BPM: 74  Conduction: 1st degree AV block  ST Segments: ST segments normal  T Waves: T waves normal  QRS axis: normal    Clinical impression: non-specific ECG            Assessment:       Diagnosis Plan   1. Lower extremity edema  proBNP    Thyroid Panel With TSH    Adult Transthoracic Echo Complete w/ Color, Spectral and Contrast if Necessary Per Protocol      2. Acute diastolic CHF (congestive heart failure)  Adult Transthoracic Echo Complete w/ Color, Spectral and Contrast if Necessary Per Protocol      3. Atherosclerosis of native coronary artery of native heart without angina pectoris  proBNP    Thyroid Panel With TSH    Adult Transthoracic Echo Complete w/ Color, Spectral and Contrast if Necessary Per Protocol      4. First degree AV block        5. Primary hypertension  proBNP    Thyroid Panel With TSH    Adult Transthoracic Echo Complete w/ Color, Spectral and Contrast if Necessary Per Protocol      6. Mixed hyperlipidemia  proBNP    Thyroid Panel With TSH    Adult Transthoracic Echo Complete w/ Color, Spectral and Contrast if Necessary Per Protocol      7. Obstructive sleep apnea syndrome  proBNP    Thyroid Panel With TSH    Adult Transthoracic Echo Complete w/ Color, Spectral and Contrast if Necessary Per Protocol      8. Class 1 obesity with serious comorbidity and body mass index (BMI) of 33.0 to 33.9 in adult, unspecified  obesity type               Plan:       1/2.  Lower Extremity Edema.  He reports edema for the past year that has not been bothersome.  He is here today because his physical therapist recommended that he see his cardiologist for the edema.  I do not feel that his medications are causing the edema.  I reviewed his recent blood work and it was stable.  I have ordered a proBNP and thyroid panel to be completed.  Check echocardiogram.  He denies shortness of breath.  Possibly he has an element of diastolic heart failure.  I recommended low-sodium diet, leg elevation, and support hose.    3.  Coronary Artery Disease: Status post drug-eluting stent placement to the LAD x 2 in 2017.  He denies angina.  I am not sure why is not on aspirin, but he needs to start aspirin 81 mg daily.  Continue atorvastatin.    4.  First-degree AV block.    5.  Hypertension: Blood pressure excellent today.    6.  Hyperlipidemia: Remains on atorvastatin.    7.  Obstructive sleep apnea.  He does not use a CPAP machine.    8.  Obesity. Body mass index is 33.43 kg/m².     9.  Further recommendations to follow.    As always, it has been a pleasure to participate in your patient's care. Thank you.         Sincerely,         KEE Jesus  [facility] Cardiology      Dictated utilizing Dragon Dictation

## 2025-05-06 NOTE — PROGRESS NOTES
"Chief Complaint  Results (Talk about labs from March)    Subjective        Enzo Dial presents to [facility]  History of Present Illness  He was last seen in our office on March 10, 2025 for a Medicare wellness visit.  His alkaline phosphatase was elevated at 135, which has been elevated for at least 3 years.  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  He was refered to gastroenterology, but has yet to schedule an appointment.     His hemoglobin A1c was 6.7%, putting him in the category of type 2 diabetes mellitus. Advised to recheck in 6 months, though medicinal initiation is not out of the question.     He is also needing refills on medications.       Objective   Vital Signs:  /80   Pulse 103   Ht 167.6 cm (66\")   Wt 94.8 kg (209 lb)   SpO2 95%   BMI 33.73 kg/m²   Estimated body mass index is 33.73 kg/m² as calculated from the following:    Height as of this encounter: 167.6 cm (66\").    Weight as of this encounter: 94.8 kg (209 lb).               Physical Exam  Vitals and nursing note reviewed.   Constitutional:       General: He is not in acute distress.     Appearance: Normal appearance. He is not ill-appearing, toxic-appearing or diaphoretic.   Neurological:      General: No focal deficit present.      Mental Status: He is alert and oriented to person, place, and time. Mental status is at baseline.   Psychiatric:         Mood and Affect: Mood normal.         Behavior: Behavior normal.         Thought Content: Thought content normal.         Judgment: Judgment normal.        Result Review :                   Assessment and Plan   Patient is a pleasant 80 year-old with CAD hx coronary artery stent placement, hyperlipidemia, BPH, LIBRA here to discuss lab results, specifically elevated hemoglobin A1c and positive AMA with elevated alkaline phosphatase. "           Diagnoses and all orders for this visit:    1. Type 2 diabetes mellitus without complication, without long-term current use of insulin (Primary)  Comments:  Discussed treatment. Declines medication at this time. Wants to make some lifestyle changes. Recheck in 4 months.  Orders:  -     Hemoglobin A1c; Future    2. Antimitochondrial antibody positive  Comments:  Follow up with gastroenterology for further evaluation.    3. Benign essential hypertension  -     atorvastatin (LIPITOR) 20 MG tablet; Take 1 tablet by mouth Daily.  Dispense: 90 tablet; Refill: 3  -     losartan (COZAAR) 100 MG tablet; Take 1 tablet by mouth Daily.  Dispense: 90 tablet; Refill: 3  -     metoprolol succinate XL (TOPROL-XL) 25 MG 24 hr tablet; Take 1 tablet by mouth Daily.  Dispense: 90 tablet; Refill: 3    4. History of coronary artery stent placement  -     atorvastatin (LIPITOR) 20 MG tablet; Take 1 tablet by mouth Daily.  Dispense: 90 tablet; Refill: 3  -     losartan (COZAAR) 100 MG tablet; Take 1 tablet by mouth Daily.  Dispense: 90 tablet; Refill: 3  -     metoprolol succinate XL (TOPROL-XL) 25 MG 24 hr tablet; Take 1 tablet by mouth Daily.  Dispense: 90 tablet; Refill: 3    5. Atherosclerosis of native coronary artery of native heart, unspecified whether angina present  Comments:  Continue to follow up with Dr. Huggins as directed.  Orders:  -     atorvastatin (LIPITOR) 20 MG tablet; Take 1 tablet by mouth Daily.  Dispense: 90 tablet; Refill: 3  -     losartan (COZAAR) 100 MG tablet; Take 1 tablet by mouth Daily.  Dispense: 90 tablet; Refill: 3  -     metoprolol succinate XL (TOPROL-XL) 25 MG 24 hr tablet; Take 1 tablet by mouth Daily.  Dispense: 90 tablet; Refill: 3    6. Mixed hyperlipidemia  -     atorvastatin (LIPITOR) 20 MG tablet; Take 1 tablet by mouth Daily.  Dispense: 90 tablet; Refill: 3  -     losartan (COZAAR) 100 MG tablet; Take 1 tablet by mouth Daily.  Dispense: 90 tablet; Refill: 3  -     metoprolol succinate  XL (TOPROL-XL) 25 MG 24 hr tablet; Take 1 tablet by mouth Daily.  Dispense: 90 tablet; Refill: 3             Follow Up   Return if symptoms worsen or fail to improve.  Patient was given instructions and counseling regarding his condition or for health maintenance advice. Please see specific information pulled into the AVS if appropriate.

## 2025-05-07 ENCOUNTER — OFFICE VISIT (OUTPATIENT)
Dept: GASTROENTEROLOGY | Facility: CLINIC | Age: 80
End: 2025-05-07
Payer: MEDICARE

## 2025-05-07 VITALS
HEIGHT: 66 IN | HEART RATE: 71 BPM | BODY MASS INDEX: 34.04 KG/M2 | SYSTOLIC BLOOD PRESSURE: 126 MMHG | TEMPERATURE: 97.1 F | WEIGHT: 211.8 LBS | DIASTOLIC BLOOD PRESSURE: 78 MMHG

## 2025-05-07 DIAGNOSIS — R76.0 ELEVATED ANTIBODY LEVELS: ICD-10-CM

## 2025-05-07 DIAGNOSIS — R74.8 ELEVATED ALKALINE PHOSPHATASE LEVEL: Primary | ICD-10-CM

## 2025-05-07 NOTE — PROGRESS NOTES
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  He is new to our office will follow with Dr. Kaufman.    He has mildly persistently elevated alkaline phosphatase for at least 3 years.  Otherwise normal LFTs.  3/13/2025 serologic workup showed , elevated AMA at 128.  Normal GGT and anti-smooth muscle antibody.  Normal thyroid panel.  PCP has ordered liver ultrasound.    History of hypertension, hyperlipidemia, diabetic type II, BMI is 33.4.  Lipid panel in February was normal other than HDL low at 33.  He is on a statin and blood pressure medication.    Objective   Vital Signs:   /78 (BP Location: Left arm, Patient Position: Sitting, Cuff Size: Adult)   Pulse 71   Temp 97.1 °F (36.2 °C) (Temporal)   Ht 167.6 cm (65.98\")   Wt 96.1 kg (211 lb 12.8 oz)   BMI 34.21 kg/m²       Physical Exam  Vitals reviewed.   Constitutional:       General: He is awake. He is not in acute distress.     Appearance: Normal appearance. He is well-developed and well-groomed.   HENT:      Head: Normocephalic.   Pulmonary:      Effort: Pulmonary effort is normal. No respiratory distress.   Abdominal:      General: Bowel sounds are normal. There is no distension.      Palpations: Abdomen is soft. There is no hepatomegaly or mass.      Tenderness: There is no abdominal tenderness.   Skin:     Coloration: Skin is not pale.   Neurological:      Mental Status: He is alert and oriented to person, place, and time.      Gait: Gait is intact.   Psychiatric:         Mood and Affect: Mood and affect normal.         Speech: Speech normal.         Behavior: Behavior is cooperative.         Judgment: Judgment normal.          Result Review :             Assessment and Plan    Diagnoses and all orders for this visit:    1. Elevated alkaline " phosphatase level (Primary)  -     US Derived Fat Fraction  -     US Elastography Parenchyma  -     US Liver    2. Elevated antibody levels  -     US Derived Fat Fraction  -     US Elastography Parenchyma  -     US Liver    3. 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  Will likely need ursodiol but will need picture of his liver to rule out cirrhosis prior to starting this.  Weight-based dosing for ursodiol will be 3833-5737 mg/day split up into 2 doses.    Recommend proceeding with liver ultrasound and will perform elastography at the same time to evaluate fibrosis score and possibility of fatty liver as a contributory factor.    Follow Up   Return in about 3 months (around 8/7/2025).    Dragon dictation used throughout this note.     Rosemary Obrien PA-C

## 2025-05-12 ENCOUNTER — HOSPITAL ENCOUNTER (OUTPATIENT)
Dept: CARDIOLOGY | Facility: HOSPITAL | Age: 80
Discharge: HOME | End: 2025-05-12
Admitting: NURSE PRACTITIONER
Payer: MEDICARE

## 2025-05-12 VITALS
HEART RATE: 80 BPM | BODY MASS INDEX: 35.16 KG/M2 | DIASTOLIC BLOOD PRESSURE: 60 MMHG | SYSTOLIC BLOOD PRESSURE: 120 MMHG | WEIGHT: 211 LBS | HEIGHT: 65 IN

## 2025-05-12 DIAGNOSIS — G47.33 OBSTRUCTIVE SLEEP APNEA SYNDROME: ICD-10-CM

## 2025-05-12 DIAGNOSIS — I25.10 ATHEROSCLEROSIS OF NATIVE CORONARY ARTERY OF NATIVE HEART WITHOUT ANGINA PECTORIS: ICD-10-CM

## 2025-05-12 DIAGNOSIS — I10 PRIMARY HYPERTENSION: ICD-10-CM

## 2025-05-12 DIAGNOSIS — I50.31 ACUTE DIASTOLIC CHF (CONGESTIVE HEART FAILURE): ICD-10-CM

## 2025-05-12 DIAGNOSIS — E78.2 MIXED HYPERLIPIDEMIA: ICD-10-CM

## 2025-05-12 DIAGNOSIS — R60.0 LOWER EXTREMITY EDEMA: ICD-10-CM

## 2025-05-12 PROBLEM — I77.810 AORTIC ROOT DILATION: Status: ACTIVE | Noted: 2025-05-12

## 2025-05-12 PROBLEM — I70.0 AORTIC CALCIFICATION: Status: ACTIVE | Noted: 2025-05-12

## 2025-05-12 PROBLEM — I36.1 NONRHEUMATIC TRICUSPID VALVE REGURGITATION: Status: ACTIVE | Noted: 2025-05-12

## 2025-05-12 PROBLEM — I35.1 NONRHEUMATIC AORTIC VALVE INSUFFICIENCY: Status: ACTIVE | Noted: 2025-05-12

## 2025-05-12 LAB
AORTIC ARCH: 2.5 CM
AORTIC DIMENSIONLESS INDEX: 0.79 (DI)
ASCENDING AORTA: 4 CM
AV MEAN PRESS GRAD SYS DOP V1V2: 4 MMHG
AV VMAX SYS DOP: 137 CM/SEC
BH CV ECHO MEAS - ACS: 2.16 CM
BH CV ECHO MEAS - AI P1/2T: 832.6 MSEC
BH CV ECHO MEAS - AO MAX PG: 7.5 MMHG
BH CV ECHO MEAS - AO ROOT DIAM: 3.9 CM
BH CV ECHO MEAS - AO V2 VTI: 30.7 CM
BH CV ECHO MEAS - AVA(I,D): 2.6 CM2
BH CV ECHO MEAS - EDV(CUBED): 97.3 ML
BH CV ECHO MEAS - EDV(MOD-SP2): 100 ML
BH CV ECHO MEAS - EDV(MOD-SP4): 100 ML
BH CV ECHO MEAS - EF(MOD-SP2): 64 %
BH CV ECHO MEAS - EF(MOD-SP4): 63 %
BH CV ECHO MEAS - ESV(CUBED): 23 ML
BH CV ECHO MEAS - ESV(MOD-SP2): 36 ML
BH CV ECHO MEAS - ESV(MOD-SP4): 37 ML
BH CV ECHO MEAS - FS: 38.2 %
BH CV ECHO MEAS - IVS/LVPW: 1.09 CM
BH CV ECHO MEAS - IVSD: 1.2 CM
BH CV ECHO MEAS - LAT PEAK E' VEL: 9.5 CM/SEC
BH CV ECHO MEAS - LV DIASTOLIC VOL/BSA (35-75): 49.4 CM2
BH CV ECHO MEAS - LV MASS(C)D: 192.9 GRAMS
BH CV ECHO MEAS - LV MAX PG: 4.8 MMHG
BH CV ECHO MEAS - LV MEAN PG: 2 MMHG
BH CV ECHO MEAS - LV SYSTOLIC VOL/BSA (12-30): 18.3 CM2
BH CV ECHO MEAS - LV V1 MAX: 110 CM/SEC
BH CV ECHO MEAS - LV V1 VTI: 24.3 CM
BH CV ECHO MEAS - LVIDD: 4.6 CM
BH CV ECHO MEAS - LVIDS: 2.8 CM
BH CV ECHO MEAS - LVOT AREA: 3.2 CM2
BH CV ECHO MEAS - LVOT DIAM: 2.03 CM
BH CV ECHO MEAS - LVPWD: 1.1 CM
BH CV ECHO MEAS - MED PEAK E' VEL: 6.7 CM/SEC
BH CV ECHO MEAS - MR MAX PG: 76.4 MMHG
BH CV ECHO MEAS - MR MAX VEL: 437.1 CM/SEC
BH CV ECHO MEAS - MV A DUR: 0.1 SEC
BH CV ECHO MEAS - MV A MAX VEL: 49 CM/SEC
BH CV ECHO MEAS - MV DEC SLOPE: 302 CM/SEC2
BH CV ECHO MEAS - MV DEC TIME: 0.19 SEC
BH CV ECHO MEAS - MV E MAX VEL: 94.7 CM/SEC
BH CV ECHO MEAS - MV E/A: 1.9
BH CV ECHO MEAS - MV MAX PG: 3 MMHG
BH CV ECHO MEAS - MV MEAN PG: 1.01 MMHG
BH CV ECHO MEAS - MV P1/2T: 77.7 MSEC
BH CV ECHO MEAS - MV V2 VTI: 26.3 CM
BH CV ECHO MEAS - MVA(P1/2T): 2.8 CM2
BH CV ECHO MEAS - MVA(VTI): 3 CM2
BH CV ECHO MEAS - PA ACC TIME: 0.06 SEC
BH CV ECHO MEAS - PA V2 MAX: 95 CM/SEC
BH CV ECHO MEAS - PULM A REVS DUR: 0.09 SEC
BH CV ECHO MEAS - PULM A REVS VEL: 23.4 CM/SEC
BH CV ECHO MEAS - PULM DIAS VEL: 63.1 CM/SEC
BH CV ECHO MEAS - PULM S/D: 0.64
BH CV ECHO MEAS - PULM SYS VEL: 40.2 CM/SEC
BH CV ECHO MEAS - QP/QS: 0.64
BH CV ECHO MEAS - RAP SYSTOLE: 8 MMHG
BH CV ECHO MEAS - RV MAX PG: 1.34 MMHG
BH CV ECHO MEAS - RV V1 MAX: 57.8 CM/SEC
BH CV ECHO MEAS - RV V1 VTI: 11.6 CM
BH CV ECHO MEAS - RVOT DIAM: 2.34 CM
BH CV ECHO MEAS - RVSP: 30.8 MMHG
BH CV ECHO MEAS - SUP REN AO DIAM: 2 CM
BH CV ECHO MEAS - SV(LVOT): 78.7 ML
BH CV ECHO MEAS - SV(MOD-SP2): 64 ML
BH CV ECHO MEAS - SV(MOD-SP4): 63 ML
BH CV ECHO MEAS - SV(RVOT): 50 ML
BH CV ECHO MEAS - SVI(LVOT): 38.9 ML/M2
BH CV ECHO MEAS - SVI(MOD-SP2): 31.6 ML/M2
BH CV ECHO MEAS - SVI(MOD-SP4): 31.1 ML/M2
BH CV ECHO MEAS - TAPSE (>1.6): 2.31 CM
BH CV ECHO MEAS - TR MAX PG: 22.8 MMHG
BH CV ECHO MEAS - TR MAX VEL: 238.9 CM/SEC
BH CV ECHO MEASUREMENTS AVERAGE E/E' RATIO: 11.69
BH CV XLRA - RV BASE: 3.2 CM
BH CV XLRA - RV LENGTH: 8.3 CM
BH CV XLRA - RV MID: 1.97 CM
BH CV XLRA - TDI S': 12.2 CM/SEC
LEFT ATRIUM VOLUME INDEX: 28.9 ML/M2
LV EF BIPLANE MOD: 63.4 %
SINUS: 3.9 CM
STJ: 3.6 CM

## 2025-05-12 PROCEDURE — 93306 TTE W/DOPPLER COMPLETE: CPT | Performed by: STUDENT IN AN ORGANIZED HEALTH CARE EDUCATION/TRAINING PROGRAM

## 2025-05-12 PROCEDURE — 93306 TTE W/DOPPLER COMPLETE: CPT

## 2025-05-20 ENCOUNTER — TELEPHONE (OUTPATIENT)
Dept: GASTROENTEROLOGY | Facility: CLINIC | Age: 80
End: 2025-05-20
Payer: MEDICARE

## 2025-05-20 ENCOUNTER — HOSPITAL ENCOUNTER (OUTPATIENT)
Facility: HOSPITAL | Age: 80
Discharge: HOME | End: 2025-05-20
Admitting: PHYSICIAN ASSISTANT
Payer: MEDICARE

## 2025-05-20 ENCOUNTER — APPOINTMENT (OUTPATIENT)
Facility: HOSPITAL | Age: 80
End: 2025-05-20
Payer: MEDICARE

## 2025-05-20 ENCOUNTER — TELEPHONE (OUTPATIENT)
Dept: GASTROENTEROLOGY | Facility: CLINIC | Age: 80
End: 2025-05-20

## 2025-05-20 PROCEDURE — 76705 ECHO EXAM OF ABDOMEN: CPT

## 2025-05-20 PROCEDURE — 76981 USE PARENCHYMA: CPT

## 2025-05-20 PROCEDURE — 0690T QUAN US TIS CHARAC W/DX US: CPT

## 2025-05-20 NOTE — TELEPHONE ENCOUNTER
Hub staff attempted to follow warm transfer process and was unsuccessful     Caller: Enzo Dial    Relationship to patient: Self    Best call back number: [phone]     Patient is needing: PT WAS SUPPOSED TO HAVE MEDICINE CALLED IN AT [facility] BUT HASN'T RECEIVED IT YET.

## 2025-05-20 NOTE — TELEPHONE ENCOUNTER
Pt came into the office and Rosemary was supposed to call some medication to his pharmacy.  He couldn't remember what it was.  Will you check on this for him?

## 2025-05-20 NOTE — TELEPHONE ENCOUNTER
Sorry about that.  I completed note just now.  I was holding off on sending in ursodiol for him until I could get the liver ultrasound results back.  Would like to know if he has cirrhosis prior to prescribing this as it might change dosing and recommendations.  He had these done today but the results are not back yet.  So once the results are back hopefully in the next week, I can send in the ursodiol.

## 2025-05-21 NOTE — TELEPHONE ENCOUNTER
Please see message that I sent last night to the nurses--duplicate.  Holding on ursodiol until I can get imaging results back.  He had the ultrasound done yesterday but results are not completed yet.

## 2025-07-01 ENCOUNTER — TELEPHONE (OUTPATIENT)
Dept: GASTROENTEROLOGY | Facility: CLINIC | Age: 80
End: 2025-07-01
Payer: MEDICARE

## 2025-07-18 ENCOUNTER — TELEPHONE (OUTPATIENT)
Dept: GASTROENTEROLOGY | Facility: CLINIC | Age: 80
End: 2025-07-18
Payer: MEDICARE

## 2025-07-22 NOTE — PROGRESS NOTES
RM:________     PCP: Leonel, Zoë B, APRN Last EKG :  2025    : 1945  AGE: 80 y.o.    REASON FOR VISIT: __________________________________________    ____________________________________________________________         Chest Pain:_______   Palpitations:__________     Shortness of breathe:________  Lightheadedness/Dizziness:___________     Syncope:______ Edema:______ Fatigue:________                                                WT: ____________ HT: ______ BP: __________ HR ______   02% _______      Lipid Panel          /10/    08:25   Lipid Panel   Total Cholesterol 117    Triglycerides 64    HDL Cholesterol 33    VLDL Cholesterol 14    LDL Cholesterol  70      Recent Hospitalization/Testing:__________________________________    _____________________________________________________________    Pharmacy/Mail Order Pharmacy:_____________________________________

## 2025-07-25 ENCOUNTER — OFFICE VISIT (OUTPATIENT)
Dept: CARDIOLOGY | Age: 80
End: 2025-07-25
Payer: MEDICARE

## 2025-07-25 VITALS
HEART RATE: 59 BPM | WEIGHT: 203.6 LBS | DIASTOLIC BLOOD PRESSURE: 70 MMHG | BODY MASS INDEX: 33.92 KG/M2 | OXYGEN SATURATION: 99 % | HEIGHT: 65 IN | SYSTOLIC BLOOD PRESSURE: 120 MMHG

## 2025-07-25 DIAGNOSIS — I36.1 NONRHEUMATIC TRICUSPID VALVE REGURGITATION: ICD-10-CM

## 2025-07-25 DIAGNOSIS — Z95.5 HISTORY OF CORONARY ARTERY STENT PLACEMENT: Chronic | ICD-10-CM

## 2025-07-25 DIAGNOSIS — I35.1 NONRHEUMATIC AORTIC VALVE INSUFFICIENCY: ICD-10-CM

## 2025-07-25 DIAGNOSIS — I77.810 AORTIC ROOT DILATION: ICD-10-CM

## 2025-07-25 DIAGNOSIS — I25.10 ATHEROSCLEROSIS OF NATIVE CORONARY ARTERY OF NATIVE HEART, UNSPECIFIED WHETHER ANGINA PRESENT: Primary | ICD-10-CM

## 2025-07-25 PROCEDURE — 99214 OFFICE O/P EST MOD 30 MIN: CPT | Performed by: INTERNAL MEDICINE

## 2025-07-25 PROCEDURE — 1159F MED LIST DOCD IN RCRD: CPT | Performed by: INTERNAL MEDICINE

## 2025-07-25 PROCEDURE — 3074F SYST BP LT 130 MM HG: CPT | Performed by: INTERNAL MEDICINE

## 2025-07-25 PROCEDURE — 3078F DIAST BP <80 MM HG: CPT | Performed by: INTERNAL MEDICINE

## 2025-07-25 PROCEDURE — 1160F RVW MEDS BY RX/DR IN RCRD: CPT | Performed by: INTERNAL MEDICINE

## 2025-07-25 RX ORDER — ASPIRIN 81 MG/1
81 TABLET ORAL DAILY
COMMUNITY

## 2025-07-25 NOTE — PROGRESS NOTES
Subjective:     Encounter Date:07/25/25      Patient ID: Enzo Dial is a 80 y.o. male.    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    He comes in today to follow-up after his recent echocardiogram.  Echocardiogram shows normal LV size and function, normal ejection fraction, grade 1 diastolic dysfunction.    Patient had a little bit of lower extremity edema so his physical therapy had raised a question sentiment to see cardiology.  He says it really did not bother him.  He has minimal lower extreme edema he says he has had this for years.  He notes it more when it is warm.  No shortness of breath.  No orthopnea or PND.    He had blood work done, NT proBNP was 110, abnormal greater than 1800.  Echocardiogram showed grade 1 diastolic dysfunction as noted above.    Today denies any complaints.  No chest pain or chest discomfort.  No shortness of breath.  No palpitations or tachycardia.  No orthopnea or PND.    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, he was admitted for urosepsis.  While there is troponin was elevated.  He then had a subsequent cardiac catheterization and reportedly a stent was placed.      Cardiac catheterization from [place] was obtained, performed January 2017.  This demonstrated a 90% stenosis in the midsection of LAD, 70 to 80% stenosis just prior to that.  LAD was noted right heart.  Circumflex showed no significant disease, there is a 90% focal lesion second obtuse marginal is a very small vessel.  RCA is dominant.  No stenosis was seen.  Patient then had 2 drug-eluting stents placed in the LAD.    The following portions of the patient's history were reviewed and updated as appropriate: allergies, current medications, past family history, past medical history, past social history, past surgical  "history and problem list.    Procedures       Objective:     Vitals:    07/25/25 0857   BP: 120/70   BP Location: Left arm   Patient Position: Sitting   Cuff Size: Adult   Pulse: 59   SpO2: 99%   Weight: 92.4 kg (203 lb 9.6 oz)   Height: 165.1 cm (65\")     Body mass index is 33.88 kg/m².      Vitals reviewed.   Constitutional:       General: Not in acute distress.     Appearance: Well-developed. Not diaphoretic.   Eyes:      General:         Right eye: No discharge.         Left eye: No discharge.      Conjunctiva/sclera: Conjunctivae normal.      Pupils: Pupils are equal, round, and reactive to light.   HENT:      Head: Normocephalic and atraumatic.      Nose: Nose normal.   Neck:      Thyroid: No thyromegaly.      Trachea: No tracheal deviation.      Lymphadenopathy: No cervical adenopathy.   Pulmonary:      Effort: Pulmonary effort is normal. No respiratory distress.      Breath sounds: Normal breath sounds. No stridor.   Chest:      Chest wall: Not tender to palpatation.   Cardiovascular:      Normal rate. Regular rhythm.      Murmurs: There is no murmur.      . No S3 gallop. No click. No rub.   Pulses:     Intact distal pulses.   Abdominal:      General: Bowel sounds are normal. There is no distension.      Palpations: Abdomen is soft. There is no abdominal mass.      Tenderness: There is no abdominal tenderness. There is no guarding or rebound.   Musculoskeletal: Normal range of motion.         General: No tenderness or deformity.      Cervical back: Normal range of motion and neck supple. Skin:     General: Skin is warm and dry.      Findings: No erythema or rash.   Neurological:      Mental Status: Alert and oriented to person, place, and time.      Deep Tendon Reflexes: Reflexes are normal and symmetric.   Psychiatric:         Thought Content: Thought content normal.         Data and records reviewed:     Lab Results   Component Value Date    GLUCOSE 114 (H) 03/10/2025    BUN 19 03/10/2025    CREATININE " "1.07 03/10/2025    EGFRIFNONA 65 02/23/2022    EGFRIFAFRI 75 02/23/2022    BCR 18 03/10/2025    K 4.9 03/10/2025    CO2 22 03/10/2025    CALCIUM 9.2 03/10/2025    ALBUMIN 3.8 03/13/2025    AST 20 03/13/2025    ALT 20 03/13/2025     No results found for: \"CHOL\"  Lab Results   Component Value Date    TRIG 64 02/10/2025    TRIG 112 08/23/2022    TRIG 136 02/23/2022     Lab Results   Component Value Date    HDL 33 (L) 02/10/2025    HDL 31 (L) 08/23/2022    HDL 33 (L) 02/23/2022     Lab Results   Component Value Date    LDL 70 02/10/2025    LDL 66 08/23/2022     (H) 02/23/2022     Lab Results   Component Value Date    VLDL 14 02/10/2025    VLDL 21 08/23/2022    VLDL 25 02/23/2022     No results found for: \"LDLHDL\"  CBC          3/10/2025    09:06   CBC   WBC 9.1    RBC 4.93    Hemoglobin 14.6    Hematocrit 44.7    MCV 91    MCH 29.6    MCHC 32.7    RDW 12.3    Platelets 428        US Derived Fat Fraction  Result Date: 5/27/2025  1. Hepatic steatosis with ultrasound derived hepatic fat fraction of 8%. 2. Absent or mild fibrosis by ultrasound elastography.     This report was finalized on 5/27/2025 8:08 AM by Dr. Mor Herrera M.D on Workstation: [id number]      US Elastography Parenchyma  Result Date: 5/27/2025  1. Hepatic steatosis with ultrasound derived hepatic fat fraction of 8%. 2. Absent or mild fibrosis by ultrasound elastography.     This report was finalized on 5/27/2025 8:08 AM by Dr. Mor Herrera M.D on Workstation: [id number]      US Liver  Result Date: 5/27/2025  1. Hepatic steatosis with ultrasound derived hepatic fat fraction of 8%. 2. Absent or mild fibrosis by ultrasound elastography.     This report was finalized on 5/27/2025 8:08 AM by Dr. Mor Herrera M.D on Workstation: [id number]      Results for orders placed during the hospital encounter of 05/12/25    Adult Transthoracic Echo Complete w/ Color, Spectral and Contrast if Necessary Per Protocol 05/12/2025  8:52 AM    Interpretation " Summary    Left ventricular systolic function is normal. Calculated left ventricular EF = 63.4%    Left ventricular diastolic function is consistent with (grade I) impaired relaxation.    Normal right ventricular size and function.    Mild tricuspid regurgitation.  Estimated right ventricular systolic pressure from tricuspid regurgitation is normal (<35 mmHg). Calculated right ventricular systolic pressure from tricuspid regurgitation is 31 mmHg.    Mild dilation of the aortic root is present. The aortic root measures 3.9 cm.  Mild dilation of the ascending aorta measuring 4.0 cm    Mild aortic regurgitation.          Assessment:          Diagnosis Plan   1. Atherosclerosis of native coronary artery of native heart, unspecified whether angina present        2. History of coronary artery stent placement        3. Aortic root dilation        4. Nonrheumatic aortic valve insufficiency        5. Nonrheumatic tricuspid valve regurgitation                 Plan:       1.  Coronary disease, prior stent placement- no CP. Continue GDMT  2.  Mixed hyperlipidemia, no recent lipid levels, to see you soon for labs  3.  Hypertension, well controlled, cont current medical therapy, Cr, BUN reviewed  4. LE edema- normal NPpro BNP.  Echo shows grade 1 diastolic dysfunction with normal left atrial dimension and normal left atrial pressure, really no suggestion of any cardiac etiology of his mild lower extremity edema that has had for years.  At this point I did offer that if it became a bother for him we could employ low-dose diuretics but he declined stating this point it does not bother him at all.  He will continue to pay attention to his diet with sodium intake and also continue to work to be active.      Thank you very much for allowing us to participate in the care of this pleasant patient.  Please don't hesitate to call if I can be of assistance in any way.      Current Outpatient Medications:     aspirin 81 MG EC tablet, Take 1  tablet by mouth Daily., Disp: , Rfl:     atorvastatin (LIPITOR) 20 MG tablet, Take 1 tablet by mouth Daily., Disp: 90 tablet, Rfl: 3    ciclopirox (PENLAC) 8 % solution, APPLY TOPICALLY ONCE A DAY AT BEDTIME, Disp: , Rfl:     losartan (COZAAR) 100 MG tablet, Take 1 tablet by mouth Daily., Disp: 90 tablet, Rfl: 3    metoprolol succinate XL (TOPROL-XL) 25 MG 24 hr tablet, Take 1 tablet by mouth Daily., Disp: 90 tablet, Rfl: 3    ursodiol (ACTIGALL) 300 MG capsule, Take 2 capsules by mouth 2 (Two) Times a Day. (Patient not taking: Reported on 7/25/2025), Disp: 360 capsule, Rfl: 1         No follow-ups on file.